# Patient Record
Sex: MALE | Race: WHITE
[De-identification: names, ages, dates, MRNs, and addresses within clinical notes are randomized per-mention and may not be internally consistent; named-entity substitution may affect disease eponyms.]

---

## 2018-03-28 ENCOUNTER — HOSPITAL ENCOUNTER (INPATIENT)
Dept: HOSPITAL 47 - EC | Age: 28
LOS: 4 days | Discharge: HOME | DRG: 872 | End: 2018-04-01
Payer: COMMERCIAL

## 2018-03-28 VITALS — BODY MASS INDEX: 34.9 KG/M2

## 2018-03-28 DIAGNOSIS — J03.00: ICD-10-CM

## 2018-03-28 DIAGNOSIS — F84.0: ICD-10-CM

## 2018-03-28 DIAGNOSIS — J05.10: ICD-10-CM

## 2018-03-28 DIAGNOSIS — G47.33: ICD-10-CM

## 2018-03-28 DIAGNOSIS — E87.0: ICD-10-CM

## 2018-03-28 DIAGNOSIS — D72.829: ICD-10-CM

## 2018-03-28 DIAGNOSIS — T38.0X5A: ICD-10-CM

## 2018-03-28 DIAGNOSIS — E66.01: ICD-10-CM

## 2018-03-28 DIAGNOSIS — A40.0: Primary | ICD-10-CM

## 2018-03-28 DIAGNOSIS — K11.20: ICD-10-CM

## 2018-03-28 LAB
ALBUMIN SERPL-MCNC: 4.4 G/DL (ref 3.5–5)
ALP SERPL-CCNC: 84 U/L (ref 38–126)
ALT SERPL-CCNC: 53 U/L (ref 21–72)
ANION GAP SERPL CALC-SCNC: 18 MMOL/L
AST SERPL-CCNC: 37 U/L (ref 17–59)
BASOPHILS # BLD AUTO: 0.1 K/UL (ref 0–0.2)
BASOPHILS NFR BLD AUTO: 0 %
BUN SERPL-SCNC: 20 MG/DL (ref 9–20)
CALCIUM SPEC-MCNC: 10.1 MG/DL (ref 8.4–10.2)
CHLORIDE SERPL-SCNC: 103 MMOL/L (ref 98–107)
CO2 SERPL-SCNC: 21 MMOL/L (ref 22–30)
EOSINOPHIL # BLD AUTO: 0.1 K/UL (ref 0–0.7)
EOSINOPHIL NFR BLD AUTO: 1 %
ERYTHROCYTE [DISTWIDTH] IN BLOOD BY AUTOMATED COUNT: 5.33 M/UL (ref 4.3–5.9)
ERYTHROCYTE [DISTWIDTH] IN BLOOD: 12.8 % (ref 11.5–15.5)
GLUCOSE BLD-MCNC: 100 MG/DL (ref 75–99)
GLUCOSE SERPL-MCNC: 97 MG/DL (ref 74–99)
HCT VFR BLD AUTO: 47.2 % (ref 39–53)
HGB BLD-MCNC: 15.4 GM/DL (ref 13–17.5)
LYMPHOCYTES # SPEC AUTO: 0.5 K/UL (ref 1–4.8)
LYMPHOCYTES NFR SPEC AUTO: 2 %
MCH RBC QN AUTO: 28.9 PG (ref 25–35)
MCHC RBC AUTO-ENTMCNC: 32.6 G/DL (ref 31–37)
MCV RBC AUTO: 88.5 FL (ref 80–100)
MONOCYTES # BLD AUTO: 1 K/UL (ref 0–1)
MONOCYTES NFR BLD AUTO: 4 %
NEUTROPHILS # BLD AUTO: 22 K/UL (ref 1.3–7.7)
NEUTROPHILS NFR BLD AUTO: 92 %
PLATELET # BLD AUTO: 251 K/UL (ref 150–450)
POTASSIUM SERPL-SCNC: 4.1 MMOL/L (ref 3.5–5.1)
PROT SERPL-MCNC: 7.7 G/DL (ref 6.3–8.2)
SODIUM SERPL-SCNC: 142 MMOL/L (ref 137–145)
WBC # BLD AUTO: 23.8 K/UL (ref 3.8–10.6)

## 2018-03-28 PROCEDURE — 87040 BLOOD CULTURE FOR BACTERIA: CPT

## 2018-03-28 PROCEDURE — 96366 THER/PROPH/DIAG IV INF ADDON: CPT

## 2018-03-28 PROCEDURE — 83605 ASSAY OF LACTIC ACID: CPT

## 2018-03-28 PROCEDURE — 80048 BASIC METABOLIC PNL TOTAL CA: CPT

## 2018-03-28 PROCEDURE — 99284 EMERGENCY DEPT VISIT MOD MDM: CPT

## 2018-03-28 PROCEDURE — 96368 THER/DIAG CONCURRENT INF: CPT

## 2018-03-28 PROCEDURE — 71045 X-RAY EXAM CHEST 1 VIEW: CPT

## 2018-03-28 PROCEDURE — 87430 STREP A AG IA: CPT

## 2018-03-28 PROCEDURE — 96375 TX/PRO/DX INJ NEW DRUG ADDON: CPT

## 2018-03-28 PROCEDURE — 83735 ASSAY OF MAGNESIUM: CPT

## 2018-03-28 PROCEDURE — 85027 COMPLETE CBC AUTOMATED: CPT

## 2018-03-28 PROCEDURE — 70491 CT SOFT TISSUE NECK W/DYE: CPT

## 2018-03-28 PROCEDURE — 85025 COMPLETE CBC W/AUTO DIFF WBC: CPT

## 2018-03-28 PROCEDURE — 96365 THER/PROPH/DIAG IV INF INIT: CPT

## 2018-03-28 PROCEDURE — 36415 COLL VENOUS BLD VENIPUNCTURE: CPT

## 2018-03-28 PROCEDURE — 80053 COMPREHEN METABOLIC PANEL: CPT

## 2018-03-28 RX ADMIN — CEFAZOLIN SCH MLS/HR: 330 INJECTION, POWDER, FOR SOLUTION INTRAMUSCULAR; INTRAVENOUS at 22:24

## 2018-03-28 NOTE — CONS
CONSULTATION



REASON FOR CONSULTATION:

Sore throat.



HISTORY:

This is a 28-year-old white male who has a 2-day history of progressively worsening

sore throat.  He noticed that the right side of his neck was more swollen today and he

has had some difficulty with swallowing. He also has some hoarseness and a little

difficulty with breathing.  He has had no stridor.  He has had difficulty swallowing to

the point where he can handle some of his own secretions but also spits out some of his

secretions.  He did have some emesis earlier also.  He has had subjective fever at

home.  He had a strep-positive culture in the ER.  He has not had difficulties with his

tonsils generally or with tonsillitis.



PAST MEDICAL HISTORY:

Past medical history is negative for heart, lung or kidney disease, diabetes mellitus,

seizures, GI bleed _____.



SURGICAL HISTORY:

None.



ALLERGIES:

NO KNOWN DRUG ALLERGIES.



MEDICATIONS AT HOME:

None.



SOCIAL HISTORY:

He does not smoke or drink alcohol.



REVIEW OF SYSTEMS:

Noncontributory other than as above.



PHYSICAL EXAMINATION:

GENERAL: He is a well-developed young adult white male in no distress, although he does

tend to want to spit up some of his secretions rather than swallow, although he can

handle most of his secretions.  His voice is normal.  He has no stridor.

Vital signs show _____ about 102.1.

HEENT: Head normocephalic and atraumatic.

EARS: Bilateral canals are clear. Tympanic membranes unremarkable, mobile.

Nose shows no drainage or obstruction, although septum is deviated to the right.

Nasopharynx is unremarkable.

Oropharynx shows tonsils +3.  They are erythematous with no exudate.  There is no

trismus.  Good airway at the oropharyngeal level.

Hypopharynx and larynx exam with flexible laryngoscopy shows diffuse erythema of the

epiglottis with thickening approximately 6 mm diffusely.  There is some mild lingual

tonsillar hypertrophy also.

Vocal cords can be well visualized with flexible laryngoscopy and are mildly edematous

and erythematous, but normal mobility is noted.

Neck is supple.  He does have tenderness with diffuse shotty lymphadenopathy

bilaterally, right greater than left, but no fluctuance or erythema is noted.



CT scan report as well as the actual scan reviewed, which shows tonsillar swelling as

well as uvular edema and erythema.  Hypopharyngeal swelling as well as upper glottic

swelling.



ASSESSMENT:

1. Acute tonsillitis.

2. Acute supraglottitis.



PLAN:

Patient will be admitted and he will be placed in the ICU for observation.  He was

given steroids already IV and started on Zosyn, which will continue.  He will be

monitored closely in the ICU.  He does not need any particular airway intervention at

this point, but the airway will be monitored closely as well.



Consultation took 45 minutes with over 50% in patient counseling.



PROCEDURE NOTE:



PREOPERATIVE DIAGNOSIS:

Tonsillitis, epiglottitis.



POSTOPERATIVE DIAGNOSIS:

Tonsillitis, epiglottitis.



PROCEDURE:

Flexible laryngoscopy.



ANESTHESIA:

None.



COMPLICATIONS:

None.



BLOOD LOSS:

None.



FINDINGS:

See consult note.



PROCEDURE DESCRIPTION:

The patient was in his hospital bed in a sitting position.  Flexible laryngoscopy was

performed with systematic evaluation of the left nasal cavity, nasopharynx, oropharynx,

hypopharynx and larynx with the above findings noted.  The patient tolerated the

procedure well with no complications.  The laryngoscope was withdrawn.





MMODL / IJN: [id number] / Job#: [id number]

## 2018-03-28 NOTE — CT
EXAMINATION TYPE: CT soft tissue neck w con

 

DATE OF EXAM: 3/28/2018 8:55 PM

 

COMPARISON: NONE

 

HISTORY: Right side throat pain and SAÚL

 

CT DLP: 867.1 mGycm

Automated exposure control for dose reduction was used.

 

CONTRAST: 

CT scan of the neck is performed following with IV Contrast, patient injected with 100ml mL of Isovue
 300.  Axial images are obtained, coronal and sagittal reformatted images are reviewed.

 

FINDINGS:

 

There is normal branching pattern of the great vessels on the aortic arch. Thyroid gland is symmetric
. Trachea appears normal. There is mild thickening of the epiglottis. There is narrowing of the hypop
haryngeal airway with apparent edema seen bilaterally in the hypopharynx.

 

There is mild hypertrophy of the tonsils that measure 3.5 x 1.5 cm.

 

There are mucous retention cysts in the maxillary sinuses. There is asymmetric enlargement of the rig
ht submandibular salivary gland with surrounding fat stranding. I see no discrete abscess. The paroti
d glands are symmetric. The cervical vertebra show normal spacing and alignment.

IMPRESSION:  Enlarged right submandibular salivary gland with surrounding fat stranding consistent wi
th acute inflammatory process and cellulitis.

 

Bilateral tonsil enlargement.

 

Narrowing of the hypopharyngeal airway with edema. Hypopharyngeal airway is significantly narrowed. T
here is pharyngeal edema. There is thickening of the epiglottis suggestive of epiglottitis. This exam
 was discussed with ER physician at 9:10 PM.

 

Small mucous retention cysts in the maxillary sinuses.

## 2018-03-28 NOTE — ED
ENT HPI





<Jose Juan Christopher - Last Filed: 03/28/18 21:38>





- General


Source: patient


Mode of arrival: wheelchair


Limitations: no limitations





<Karin Mujica - Last Filed: 03/28/18 22:35>





- General


Chief complaint: ENT


Stated complaint: Sore thorat/swollen


Time Seen by Provider: 03/28/18 18:55





- History of Present Illness


Initial comments: 


28-year-old male patient presents to the emergency department today for 

complaints of sore throat and right-sided neck swelling.  Patient states Friday 

patient started to develop sore throat what he thought was a cold.  Patient 

states that his pain has been worsening over the last few days.  States that 

today when he woke up the right side of his neck was swollen, states he was 

having difficulty swallowing, and states that it feels difficult to breathe due 

to the swelling.  Patient states he has more pain on the right side on the left 

in the throat.  Patient states that today he is unable to keep down any fluids.

  States he has vomited several times.  He denies any nasal congestion or 

cough.  Denies any abdominal pain, constipation, or diarrhea. Patient denies 

any recent rash, shortness breath, chest pain, back pain, numbness, tingling, 

dizziness, weakness, hematuria, dysuria, urinary urgency, urinary frequency, 

headache, visual changes, or any other complaints.


 (Karin Mujica)





- Related Data


 Home Medications











 Medication  Instructions  Recorded  Confirmed


 


No Known Home Medications [No  03/28/18 03/28/18





Known Home Medications]   











 Allergies











Allergy/AdvReac Type Severity Reaction Status Date / Time


 


No Known Allergies Allergy   Verified 03/28/18 19:15














Review of Systems


ROS Other: All systems not noted in ROS Statement are negative.





<Jose Juan Christopher - Last Filed: 03/28/18 21:38>


ROS Other: All systems not noted in ROS Statement are negative.





<Karin Mujica - Last Filed: 03/28/18 22:35>


ROS Statement: 


Those systems with pertinent positive or pertinent negative responses have been 

documented in the HPI.








Past Medical History


Past Medical History: No Reported History


History of Any Multi-Drug Resistant Organisms: None Reported


Past Surgical History: No Surgical Hx Reported


Past Psychological History: No Psychological Hx Reported


Smoking Status: Never smoker


Past Alcohol Use History: None Reported


Past Drug Use History: None Reported





<Karin Mujica - Last Filed: 03/28/18 22:35>





General Exam


Limitations: no limitations


General appearance: alert, in no apparent distress, other (This is a well-

developed, obese male patient in no acute distress.  Vital signs upon 

presentation are temperature 102.1F, pulse 145, respirations 20, blood 

pressure 132/82, pulse ox 96% on room air.)


Eye exam: Present: normal appearance, PERRL, EOMI.  Absent: scleral icterus, 

conjunctival injection, periorbital swelling


ENT exam: Present: normal exam, mucous membranes moist, TM's normal 

bilaterally.  Absent: normal oropharynx (Unable to visualize pharynx due to 

patient's inability to open his mouth. )


Neck exam: Present: tenderness (Right sided neck tenderness), lymphadenopathy (

Right anterior cervical. ).  Absent: normal inspection, meningismus


Respiratory exam: Present: normal lung sounds bilaterally.  Absent: respiratory 

distress, wheezes, rales, rhonchi, stridor


Cardiovascular Exam: Present: normal rhythm, tachycardia, normal heart sounds.  

Absent: systolic murmur, diastolic murmur, rubs, gallop, clicks


GI/Abdominal exam: Present: soft, normal bowel sounds.  Absent: distended, 

tenderness, guarding, rebound, rigid


Neurological exam: Present: alert, oriented X3, CN II-XII intact


Psychiatric exam: Present: normal affect, normal mood


Skin exam: Present: warm, dry, intact, normal color.  Absent: rash





<Karin Mujica - Last Filed: 03/28/18 22:35>





Course





<Jose Juan Christopher - Last Filed: 03/28/18 21:38>





<Karin Mujica - Last Filed: 03/28/18 22:35>


 Vital Signs











  03/28/18 03/28/18 03/28/18





  18:47 19:16 20:32


 


Temperature 102.1 F H  101.8 F H


 


Pulse Rate 145 H 144 H 117 H


 


Respiratory 20 18 22





Rate   


 


Blood Pressure 132/82 127/64 137/61


 


O2 Sat by Pulse 96 95 96





Oximetry   














  03/28/18





  22:26


 


Temperature 99.3 F


 


Pulse Rate 100


 


Respiratory 20





Rate 


 


Blood Pressure 124/63


 


O2 Sat by Pulse 95





Oximetry 














- Reevaluation(s)


Reevaluation #1: 





03/28/18 21:28


Patient reevaluated by myself, Dr. Christopher.  Patient does state he feels somewhat 

short of breath.  Patient is able to speak in 2-4 word sentences.  Patient 

states speaking is not that difficult.  Patient does think his voice is 

somewhat hoarse.  Patient does have some mild to moderate swelling of the right 

lateral submandibular/mandibular region.  Pharynx with mild swelling of the 

tonsils and uvula with erythema.  Patient is not in respiratory distress at 

this time.  CT report and image reviewed.  Patient updated.  Dr. Salcedo has 

been paged.  Patient does meet sepsis criteria.


03/28/18 21:33


Case was discussed with Dr. Juarez, who will come to evaluate the patient.  

He does feel patient should be admitted to medicine and he will consult.  He 

agrees patient should be watched in the ICU tonight.


03/28/18 21:36


Case was also discussed with Dr. Dominique, who will admit for hospital call.  He 

requests consult for Dr. Mendez for ICU.


03/28/18 21:38


Case also discussed with Dr. Andersen, who will consult for ICU care. (Jose Juan Christopher)





Medical Decision Making





- Lab Data


Result diagrams: 


 03/28/18 19:21





 03/28/18 19:21





<Jose Juan Christopher - Last Filed: 03/28/18 21:38>





- Lab Data


Result diagrams: 


 03/28/18 19:21





 03/28/18 19:21





- Radiology Data


Radiology results: report reviewed, image reviewed





<Karin Mujica - Last Filed: 03/28/18 22:35>





- Medical Decision Making


28-year-old male patient presents into the emergency department today for 

evaluation of right-sided neck swelling, sore throat, and vomiting.  Physical 

examination does reveal right anterior cervical swelling, tonsillar hypertrophy 

and erythema.  Labs were performed and did show an elevated white blood cell 

count at 23.8, neutrophil count at 22.  Patient did test positive for strep. CT 

of the soft tissue of the neck did show salivary gland inflammation and fat 

stranding, epiglottic swelling, and subglottic swelling.  Patient did receive 

IV Decadron, IV fluids, Toradol, and over medicated here in the department.  

Patient is reporting very mild improvement of his symptoms.  He still continues 

to report difficulty breathing and swallowing.  He is not tolerating secretions 

coming spitting them out.  He does have some mild resting stridor.  Did discuss 

the case with my attending Dr. Mireles did see and evaluate the patient.  Case 

has been discussed with Dr. Andersen and Dr. Hamilton who agree to ICU admission 

for airway monitoring and management. Dr. Golden from ENT was in to see the 

patient, did perform a scope which confirmed CT findings of Epitglottic 

swelling and tonsillar swelling. We will continue patient on Zosyn and solu-

medrol. 


 (Karin Mujica)





- Lab Data


 Lab Results











  03/28/18 03/28/18 03/28/18 Range/Units





  19:21 19:21 19:21 


 


WBC  23.8 H    (3.8-10.6)  k/uL


 


RBC  5.33    (4.30-5.90)  m/uL


 


Hgb  15.4    (13.0-17.5)  gm/dL


 


Hct  47.2    (39.0-53.0)  %


 


MCV  88.5    (80.0-100.0)  fL


 


MCH  28.9    (25.0-35.0)  pg


 


MCHC  32.6    (31.0-37.0)  g/dL


 


RDW  12.8    (11.5-15.5)  %


 


Plt Count  251    (150-450)  k/uL


 


Neutrophils %  92    %


 


Lymphocytes %  2    %


 


Monocytes %  4    %


 


Eosinophils %  1    %


 


Basophils %  0    %


 


Neutrophils #  22.0 H    (1.3-7.7)  k/uL


 


Lymphocytes #  0.5 L    (1.0-4.8)  k/uL


 


Monocytes #  1.0    (0-1.0)  k/uL


 


Eosinophils #  0.1    (0-0.7)  k/uL


 


Basophils #  0.1    (0-0.2)  k/uL


 


Sodium   142   (137-145)  mmol/L


 


Potassium   4.1   (3.5-5.1)  mmol/L


 


Chloride   103   ()  mmol/L


 


Carbon Dioxide   21 L   (22-30)  mmol/L


 


Anion Gap   18   mmol/L


 


BUN   20   (9-20)  mg/dL


 


Creatinine   1.00   (0.66-1.25)  mg/dL


 


Est GFR (CKD-EPI)AfAm   >90   (>60 ml/min/1.73 sqM)  


 


Est GFR (CKD-EPI)NonAf   >90   (>60 ml/min/1.73 sqM)  


 


Glucose   97   (74-99)  mg/dL


 


Plasma Lactic Acid Duc     (0.7-2.0)  mmol/L


 


Calcium   10.1   (8.4-10.2)  mg/dL


 


Total Bilirubin   0.7   (0.2-1.3)  mg/dL


 


AST   37   (17-59)  U/L


 


ALT   53   (21-72)  U/L


 


Alkaline Phosphatase   84   ()  U/L


 


Total Protein   7.7   (6.3-8.2)  g/dL


 


Albumin   4.4   (3.5-5.0)  g/dL


 


Group A Strep Rapid    Positive A  (Negative)  














  03/28/18 Range/Units





  19:21 


 


WBC   (3.8-10.6)  k/uL


 


RBC   (4.30-5.90)  m/uL


 


Hgb   (13.0-17.5)  gm/dL


 


Hct   (39.0-53.0)  %


 


MCV   (80.0-100.0)  fL


 


MCH   (25.0-35.0)  pg


 


MCHC   (31.0-37.0)  g/dL


 


RDW   (11.5-15.5)  %


 


Plt Count   (150-450)  k/uL


 


Neutrophils %   %


 


Lymphocytes %   %


 


Monocytes %   %


 


Eosinophils %   %


 


Basophils %   %


 


Neutrophils #   (1.3-7.7)  k/uL


 


Lymphocytes #   (1.0-4.8)  k/uL


 


Monocytes #   (0-1.0)  k/uL


 


Eosinophils #   (0-0.7)  k/uL


 


Basophils #   (0-0.2)  k/uL


 


Sodium   (137-145)  mmol/L


 


Potassium   (3.5-5.1)  mmol/L


 


Chloride   ()  mmol/L


 


Carbon Dioxide   (22-30)  mmol/L


 


Anion Gap   mmol/L


 


BUN   (9-20)  mg/dL


 


Creatinine   (0.66-1.25)  mg/dL


 


Est GFR (CKD-EPI)AfAm   (>60 ml/min/1.73 sqM)  


 


Est GFR (CKD-EPI)NonAf   (>60 ml/min/1.73 sqM)  


 


Glucose   (74-99)  mg/dL


 


Plasma Lactic Acid Duc  1.6  (0.7-2.0)  mmol/L


 


Calcium   (8.4-10.2)  mg/dL


 


Total Bilirubin   (0.2-1.3)  mg/dL


 


AST   (17-59)  U/L


 


ALT   (21-72)  U/L


 


Alkaline Phosphatase   ()  U/L


 


Total Protein   (6.3-8.2)  g/dL


 


Albumin   (3.5-5.0)  g/dL


 


Group A Strep Rapid   (Negative)  














- Radiology Data





CT of the soft tissue of the neck with contrast was obtained, report was 

reviewed in its entirety.  Impression by Dr. Mckeon shows enlargement of the 

right submandibular salivary gland with surrounding fat stranding consistent 

with acute inflammatory process and cellulitis.  Bilateral tonsil enlargement.  

Hearing of the hypopharyngeal airway with edema.  Hypopharyngeal airway is 

significantly narrowed.  There is pharyngeal edema.  There is thickening of the 

epiglottis suggestive of epiglottitis. (Karin Mujica)





Disposition





<Jose Juan Christopher - Last Filed: 03/28/18 21:38>


Decision to Admit Reason: Admit from EC


Decision Date: 03/28/18


Decision Time: 21:46





<Karin Mujica - Last Filed: 03/28/18 22:35>


Clinical Impression: 


 Salivary gland infection, Strep pharyngitis, Epiglottitis, Sepsis





Disposition: ADMITTED AS IP TO THIS [facility]


Condition: Serious

## 2018-03-28 NOTE — P.HPIM
History of Present Illness


H&P Date: 03/28/18


Chief Complaint: difficulty swallowing, sore throat





27-year-old male with no significant past medical history.  Presented with 

symptoms of upper respiratory airway infection of 2 days' duration.  Patient 

reports symptoms of sore throat initially however this has been progressing 

over the past 2 days and to initially some odontophagia and today trouble 

managing his own secretions and inability to swallow even liquids due to pain.  

He also noticed swelling of the right side of his neck along with some 

hoarseness of his voice.  This reached a point where he starts noticing some 

trouble breathing 2 for which she decided come to the hospital he denies any 

stridors but in the ER he was unable to clear his own secretions and he had to 

spit told time.  He threw up once nonbloody nonbilious.  Denies any chest pain 

denies any abdominal pain he reports some subjective fever at home.  He reports 

that he is generally healthy and does not have any medical problems.


In the emergency department he tested positive for strep throat.  ENT was 

consultative due to trouble managing his secretions and to evaluate his airways

, he was deemed to be stable but to be at least monitored in the ICU in case of 

airway compromise and requiring intubation.


Computed tomography scan of soft tissues of the neck was done in the ED showed 

tonsillar enlargement along with uvular edema and hypopharyngeal swelling.





Advanced directives discussed with the patient elected full code and named his 

wife is surrogate decision maker





Review of Systems





Constitutional: Patient reports subjective fever, denies chills, denies night 

sweating,  denies significant weight changes


Eyes: Patient   denies visual changes, denies eye pain


ENT: Patient   denies ear pain, denies rhinorrhea, reports sore throat


Cardiovascular:  Patient   denies chest pain, denies exertional dyspnea, denies 

peripheral leg edema, denies orthopnea, denies paroxysmal nocturnal dyspnea


Respiratory:Patient   denies cough, denies wheezing, denies shortness of breath

, reports difficulty breathing due to right neck swelling


Gastrointestinal: Patient   denies diarrhea, denies constipation, denies nausea

, denies vomiting, denies abdominal pain


Genitourinary: Patient   denies dysuria, denies hematuria, denies changes in 

urinary habits, denies genital  lesions


Musculoskeletal: Patient   denies muscle pain, denies joint pain


Psychiatric:  Patient   denies changes in mood or memory, denies suicidal 

ideation, denies anxiety


Endocrine:  Patient   denies heat intolerance, denies cold intolerance, denies 

excessive thirst, denies polyuria


Neurological: Patient   denies focal neurologic deficits, denies weakness, 

denies numbness, denies tingling


Hem/Lymphatic: Patient   denies bleeding tendency, denies bruising, denies 

swollen lymph glands


Allergic/Immun: Patient   denies recent allergic reactions


Skin: Patient   denies rashes, denies pruritis, denies ulcers














Past Medical History


Past Medical History: No Reported History


History of Any Multi-Drug Resistant Organisms: None Reported


Past Surgical History: No Surgical Hx Reported


Past Psychological History: No Psychological Hx Reported


Smoking Status: Never smoker


Past Alcohol Use History: None Reported


Past Drug Use History: None Reported





- Past Family History


  ** Father


History Unknown: Yes


Additional Family Medical History / Comment(s): Factor V blood disorder





Medications and Allergies


Home Medications and Allergies Comment(s): 





Patient does not take any prescription medications


 Home Medications











 Medication  Instructions  Recorded  Confirmed  Type


 


No Known Home Medications [No  03/28/18 03/28/18 History





Known Home Medications]    











 Allergies











Allergy/AdvReac Type Severity Reaction Status Date / Time


 


No Known Allergies Allergy   Verified 03/28/18 19:15














Physical Exam


Vitals: 


 Vital Signs











  Temp Pulse Resp BP Pulse Ox


 


 03/28/18 22:26  99.3 F  100  20  124/63  95


 


 03/28/18 20:32  101.8 F H  117 H  22  137/61  96


 


 03/28/18 19:16   144 H  18  127/64  95


 


 03/28/18 18:47  102.1 F H  145 H  20  132/82  96








 Intake and Output











 03/28/18 03/28/18 03/29/18





 14:59 22:59 06:59


 


Other:   


 


  Weight  127.006 kg 














Constitutional:          No acute distress, conversant, pleasant, patient is 

spitting up with his oral secretions is unable to swallow.


Eyes:      Anicteric sclerae, moist conjunctiva, no lid-lag


         Pupils equal round reactive to light





ENMT:      NC/AT


         Limited exam due to inability to fully open his mouth, enlarged 

tonsils with erythema





Neck:      Swelling of the right side of the neck tender to palpation with 

multiple palpable submental and cervical lymph nodes


         Supple, or JVD, no stridors


         No carotid bruits


         No thyromegaly





Lungs:      Clear to auscultation


         Clear to percussion


         Normal respiratory effort, no accessory muscle use 





Cardiovascular:      Heart regular in rate and rhythm, 


         No murmurs, gallops, or rubs


         No peripheral edema





Abdominal:       Soft


         Nontender, no guarding, rebound or rigidity


         Abdomen moving with respiration


         Normoactive bowel sounds


         No hepatomegaly, No splenomegaly


         No palpable mass 


         No abdominal wall hernia noted 





Skin:      Normal temperature, tone, texture, turgor


         No induration


         No subcutaneous nodules 


         No rash, lesions


         No ulcers





Extremities:      No digital cyanosis 


         No clubbing


         Pedal pulses intact and symmetrical


         Radial pulses intact and symmetrical 


         No calf tenderness 





Psychiatric:      Alert and oriented to person, place and time


         Appropriate affect


         fair judgment   


      


Neuro      Muscles Strength 5/5 in all 4 extremities 


         Sensation to light touch grossly present throughout


         Cranial nerves II-XII grossly intact


         No focal sensory deficits


Lymphatics:       palpable cervical and submental lymph nodes, 





Results


CBC & Chem 7: 


 03/28/18 19:21





 03/28/18 19:21


Labs: 


 Abnormal Lab Results - Last 24 Hours (Table)











  03/28/18 03/28/18 03/28/18 Range/Units





  19:21 19:21 19:21 


 


WBC  23.8 H    (3.8-10.6)  k/uL


 


Neutrophils #  22.0 H    (1.3-7.7)  k/uL


 


Lymphocytes #  0.5 L    (1.0-4.8)  k/uL


 


Carbon Dioxide   21 L   (22-30)  mmol/L


 


POC Glucose (mg/dL)     (75-99)  mg/dL


 


Group A Strep Rapid    Positive A  (Negative)  














  03/28/18 Range/Units





  22:51 


 


WBC   (3.8-10.6)  k/uL


 


Neutrophils #   (1.3-7.7)  k/uL


 


Lymphocytes #   (1.0-4.8)  k/uL


 


Carbon Dioxide   (22-30)  mmol/L


 


POC Glucose (mg/dL)  100 H  (75-99)  mg/dL


 


Group A Strep Rapid   (Negative)  














Assessment and Plan


Assessment: 





28-year-old male with no significant past medical history presented due to 

progressive sore throat along with swelling of the right side of the neck 

resulting in changes in his voice, some difficulty in breathing, and initially 

odynophagia and then later trouble managing his own oral secretions.  Further 

investigation in the ED showed positive strep throat, along with evidence of 

pharyngeal swelling that's compromising airways and swallowing.  Patient was 

admitted to the ICU for further care and closer monitoring of the airways in 

case patient needed intubation


Plan: 





#Sepsis secondary to strep throat resulting in tonsillitis and epiglottitis


#Acute strep tonsillitis


#Acute supraglottitis with epiglottitis


#Pharyngeal edema with with odynophagia and some degree of difficulty breathing


ENT consultation noted


Continue with IV Zosyn


Continue with IV Solu-Medrol


Pain control with NSAIDs


Close monitoring of airways


Suctioning of oral secretions


ICU care





#DVT prophylaxis heparin subcu 3 times a day





Surrogate decision-maker: Patient wife


CODE STATUS: Full code


DVT prophylaxis: *Heparin subcu 3 times a day


Discussed with: Patient, ER, family, RN


Anticipated discharge: 48-72 hours


Anticipated discharge place: Home


A total of 50 minutes were spent on the care of this complex patient more than 

50% of the time was spent in counseling and care coordination.

## 2018-03-29 LAB
ANION GAP SERPL CALC-SCNC: 16 MMOL/L
BASOPHILS # BLD AUTO: 0 K/UL (ref 0–0.2)
BASOPHILS NFR BLD AUTO: 0 %
BUN SERPL-SCNC: 21 MG/DL (ref 9–20)
CALCIUM SPEC-MCNC: 9.4 MG/DL (ref 8.4–10.2)
CHLORIDE SERPL-SCNC: 107 MMOL/L (ref 98–107)
CO2 SERPL-SCNC: 21 MMOL/L (ref 22–30)
EOSINOPHIL # BLD AUTO: 0 K/UL (ref 0–0.7)
EOSINOPHIL NFR BLD AUTO: 0 %
ERYTHROCYTE [DISTWIDTH] IN BLOOD BY AUTOMATED COUNT: 4.96 M/UL (ref 4.3–5.9)
ERYTHROCYTE [DISTWIDTH] IN BLOOD: 12.9 % (ref 11.5–15.5)
GLUCOSE SERPL-MCNC: 130 MG/DL (ref 74–99)
HCT VFR BLD AUTO: 44.7 % (ref 39–53)
HGB BLD-MCNC: 14.6 GM/DL (ref 13–17.5)
LYMPHOCYTES # SPEC AUTO: 0.7 K/UL (ref 1–4.8)
LYMPHOCYTES NFR SPEC AUTO: 3 %
MCH RBC QN AUTO: 29.5 PG (ref 25–35)
MCHC RBC AUTO-ENTMCNC: 32.7 G/DL (ref 31–37)
MCV RBC AUTO: 90.2 FL (ref 80–100)
MONOCYTES # BLD AUTO: 0.7 K/UL (ref 0–1)
MONOCYTES NFR BLD AUTO: 3 %
NEUTROPHILS # BLD AUTO: 23.5 K/UL (ref 1.3–7.7)
NEUTROPHILS NFR BLD AUTO: 94 %
PLATELET # BLD AUTO: 236 K/UL (ref 150–450)
POTASSIUM SERPL-SCNC: 4.5 MMOL/L (ref 3.5–5.1)
SODIUM SERPL-SCNC: 144 MMOL/L (ref 137–145)
WBC # BLD AUTO: 25.1 K/UL (ref 3.8–10.6)

## 2018-03-29 PROCEDURE — 0CJS8ZZ INSPECTION OF LARYNX, VIA NATURAL OR ARTIFICIAL OPENING ENDOSCOPIC: ICD-10-PCS

## 2018-03-29 RX ADMIN — DEXTROSE MONOHYDRATE SCH MLS/HR: 5 INJECTION, SOLUTION INTRAVENOUS at 23:45

## 2018-03-29 RX ADMIN — METHYLPREDNISOLONE SODIUM SUCCINATE SCH MG: 125 INJECTION, POWDER, FOR SOLUTION INTRAMUSCULAR; INTRAVENOUS at 23:43

## 2018-03-29 RX ADMIN — DEXTROSE MONOHYDRATE SCH MLS/HR: 5 INJECTION, SOLUTION INTRAVENOUS at 15:34

## 2018-03-29 RX ADMIN — METHYLPREDNISOLONE SODIUM SUCCINATE SCH MG: 125 INJECTION, POWDER, FOR SOLUTION INTRAMUSCULAR; INTRAVENOUS at 11:55

## 2018-03-29 RX ADMIN — METHYLPREDNISOLONE SODIUM SUCCINATE SCH MG: 125 INJECTION, POWDER, FOR SOLUTION INTRAMUSCULAR; INTRAVENOUS at 00:09

## 2018-03-29 RX ADMIN — CEFAZOLIN SCH MLS/HR: 330 INJECTION, POWDER, FOR SOLUTION INTRAMUSCULAR; INTRAVENOUS at 08:22

## 2018-03-29 RX ADMIN — DEXTROSE MONOHYDRATE SCH MLS/HR: 5 INJECTION, SOLUTION INTRAVENOUS at 08:22

## 2018-03-29 RX ADMIN — HEPARIN SODIUM SCH UNIT: 5000 INJECTION, SOLUTION INTRAVENOUS; SUBCUTANEOUS at 08:22

## 2018-03-29 RX ADMIN — METHYLPREDNISOLONE SODIUM SUCCINATE SCH MG: 125 INJECTION, POWDER, FOR SOLUTION INTRAMUSCULAR; INTRAVENOUS at 06:08

## 2018-03-29 RX ADMIN — HEPARIN SODIUM SCH UNIT: 5000 INJECTION, SOLUTION INTRAVENOUS; SUBCUTANEOUS at 15:34

## 2018-03-29 RX ADMIN — CEFAZOLIN SCH MLS/HR: 330 INJECTION, POWDER, FOR SOLUTION INTRAMUSCULAR; INTRAVENOUS at 18:00

## 2018-03-29 RX ADMIN — ACETAMINOPHEN PRN MLS/HR: 10 INJECTION, SOLUTION INTRAVENOUS at 19:47

## 2018-03-29 RX ADMIN — METHYLPREDNISOLONE SODIUM SUCCINATE SCH MG: 125 INJECTION, POWDER, FOR SOLUTION INTRAMUSCULAR; INTRAVENOUS at 18:00

## 2018-03-29 RX ADMIN — ACETAMINOPHEN PRN MLS/HR: 10 INJECTION, SOLUTION INTRAVENOUS at 08:30

## 2018-03-29 RX ADMIN — DEXTROSE MONOHYDRATE SCH: 5 INJECTION, SOLUTION INTRAVENOUS at 00:08

## 2018-03-29 NOTE — PCN
PROCEDURE NOTE



PREOPERATIVE DIAGNOSIS:

Supraglottitis.



POSTOP DIAGNOSIS:

Supraglottitis.



PROCEDURE:

Flexible laryngoscopy.



ANESTHESIA:

None.



COMPLICATIONS:

None.



BLOOD LOSS:

None.



DESCRIPTION OF PROCEDURE:

The patient was in his hospital chair in a sitting position.  Flexible laryngoscopy was

performed through the left nasal cavity with systematic evaluation of the left nasal

cavity, nasopharynx, oropharynx, hypopharynx and larynx with the above-noted findings

in the consult note noted.  Laryngoscope was withdrawn.  Patient tolerated this well

with no complications.





MMODL / IJN: [id number] / Job#: [id number]

## 2018-03-29 NOTE — XR
EXAMINATION TYPE: XR chest 1V

 

DATE OF EXAM: 3/29/2018

 

COMPARISON: NONE

 

HISTORY: Epiglottitis and difficulty breathing

 

TECHNIQUE: Single frontal view of the chest is obtained.

 

FINDINGS:  There is no evident airspace disease, pneumothorax, or pleural effusion. Heart size is at 
the upper limit of normal. There are overlying cardiac leads. Pulmonary vascularity and mark within n
ormal limits. 

 

IMPRESSION:  No acute process. Borderline heart size. Technique may be accentuating the appearance. F
ollow-up as indicated.

## 2018-03-29 NOTE — P.CNPUL
History of Present Illness


Consult date: 03/29/18


Chief complaint: Acute shortness of breath/epiglottitis


History of present illness: 





28-year-old obese male patient who presented to the hospital yesterday because 

of today history of sore throat and subsequently noted swelling in the right 

neck and progressive shortness of breath and difficulty in swallowing.  He also 

started losing his voice and he became hoarse and he was drooling.  He did have 

some emesis earlier.  He had subjectively feeling febrile .  In the burst 

department the patient was found to have leukocytosis with a white cell count 

of 25.  The rapid strep screen was positive.  He was started on Zosyn and he 

was given a dose of Decadron and subsequently was started on Solu-Medrol.  The 

CAT scan of the neck showed narrowing of the hypopharyngeal airway secondary to 

edema.  The hypopharyngeal airway was significantly narrowed due to the edema.  

There was thickening of the epiglottis suggestive of acute epiglottitis.  In 

the emergency the patient was seen by ENT and the flexible laryngoscopy that 

was done showed edematous and erythematous vocal cords, hypopharynx and the 

larynx were diffusely erythematous and there was evidence of epiglottitis which 

was taken approximately 6 mm in size and thickness and was diffuse 

inflammation.  There was some lingular tonsillar hypertrophy in addition.  The 

patient also had some erythema in the posterior oropharynx with no exudate.  No 

trismus.  Currently is in the intensive care unit.  Feeling better compared to 

yesterday.  Is able to speak longer sentences.  He has no stridor.  He remains 

on the same treatment.  Family is at the bedside.  No altered mentation.  No 

respiratory distress.





Review of Systems


Constitutional: Reports fatigue, Reports fever


Eyes: denies blurred vision, denies bulging eye, denies decreased vision


Ears: deny: decreased hearing, ear discharge, earache


Ears, nose, mouth and throat: Reports dysphagia, Reports neck fullness/pressure

, Reports swelling in mouth, Reports swelling in throat, Reports sore throat


Cardiovascular: Reports dyspnea on exertion


Respiratory: Reports dyspnea, Reports snoring


Gastrointestinal: Denies abdominal pain, Denies diarrhea, Denies nausea, Denies 

vomiting


Genitourinary: Reports as per HPI


Musculoskeletal: Denies myalgias


Musculoskeletal: absent: ankle pain, ankle stiffness, ankle swelling


Integumentary: Denies pruritus, Denies rash


Neurological: Denies numbness, Denies weakness


Psychiatric: Denies anxiety, Denies depression


Endocrine: Denies fatigue, Denies weight change


Hematologic/Lymphatic: Reports as per HPI


Allergic/Immunologic: Reports as per HPI





Past Medical History


Past Medical History: No Reported History


Additional Past Medical History / Comment(s): lungs collapsed during birth, 

obesity


History of Any Multi-Drug Resistant Organisms: None Reported


Past Surgical History: No Surgical Hx Reported


Past Psychological History: No Psychological Hx Reported


Smoking Status: Never smoker


Past Alcohol Use History: None Reported


Past Drug Use History: None Reported





- Past Family History


  ** Father


History Unknown: Yes


Additional Family Medical History / Comment(s): Factor V blood disorder





Medications and Allergies


 Home Medications











 Medication  Instructions  Recorded  Confirmed  Type


 


No Known Home Medications [No  03/28/18 03/28/18 History





Known Home Medications]    











 Allergies











Allergy/AdvReac Type Severity Reaction Status Date / Time


 


No Known Allergies Allergy   Verified 03/28/18 19:15














Physical Exam


Vitals: 


 Vital Signs











  Temp Pulse Pulse Resp BP BP Pulse Ox


 


 03/29/18 09:00   117 H   32 H  116/76   96


 


 03/29/18 08:30   117 H   22  116/76   96


 


 03/29/18 08:00  100.7 F H  120 H  117 H  17  123/76   94 L


 


 03/29/18 07:30   113 H   16  123/76   95


 


 03/29/18 07:00   113 H   28 H  139/77   95


 


 03/29/18 06:30   108 H   11 L  139/77   97


 


 03/29/18 06:00   108 H   17  128/75   95


 


 03/29/18 05:30   113 H   15  128/75   94 L


 


 03/29/18 05:00  98 F  100   16  126/79   97


 


 03/29/18 04:30   106 H   14  126/79   97


 


 03/29/18 04:00   93  98  13  142/78   96


 


 03/29/18 03:30   101 H   22  142/78   94 L


 


 03/29/18 03:00   100   15  130/77   95


 


 03/29/18 02:30   91   13  130/77   96


 


 03/29/18 02:00   100   13  139/74   93 L


 


 03/29/18 01:30   108 H   13  139/74   95


 


 03/29/18 01:00   98   27 H  139/74   95


 


 03/29/18 00:30   101 H   21  130/79   94 L


 


 03/29/18 00:00   106 H  98  27 H  130/79   97


 


 03/28/18 23:30  98.6 F  118 H   14  133/73   96


 


 03/28/18 23:00   107 H    133/73   96


 


 03/28/18 22:50   106 H     


 


 03/28/18 22:26  99.3 F  100   20  124/63   95


 


 03/28/18 22:05  98.4 F    20   133/73 


 


 03/28/18 20:32  101.8 F H  117 H   22  137/61   96


 


 03/28/18 19:16   144 H   18  127/64   95


 


 03/28/18 18:47  102.1 F H  145 H   20  132/82   96








 Intake and Output











 03/28/18 03/29/18 03/29/18





 22:59 06:59 14:59


 


Intake Total  900 250


 


Output Total  340 400


 


Balance  560 -150


 


Intake:   


 


  IV  900 200


 


    Sodium Chloride 0.9% 1,  900 200





    000 ml @ 100 mls/hr IV .   





    Q10H [facility] Rx#:[id number]   


 


  Intake, IV Titration   50





  Amount   


 


    Piperacillin-Tazobactam 3   50





    .375 gm In Dextrose/Water   





    1 50ml.bag @ 12.5 mls/hr   





    IVPB ONCE STA Rx#:   





    [id number]   


 


Output:   


 


  Urine  340 400


 


Other:   


 


  Voiding Method  Urinal 


 


  Weight 127.006 kg 134.3 kg 134.3 kg














Gen. appearance the patient is calm comfortable in no acute distress.  He is 

obese well-developed young male.  No audible striders.Head exam was generally 

normal. There was no scleral icterus or corneal arcus. Mucous membranes were 

moist.  Neck is short and supple.  There is crowding of the posterior 

oropharynx with a Mallampati class IV.  There is some mild tonsillar 

enlargement.  There is some mild erythema of the posterior oropharynx.  The 

patient has no exudate.  The neck is slightly tender upon palpation.  No masses 

could be felt.  No lymphadenopathy.  Please refer to the full laryngoscopy that 

was done by ENT.Lungs were clear to auscultation and percussion, and with 

normal diaphragmatic excursion. No wheezes or rales were noted. Cardiac exam 

revealed the PMI to be normally situated and sized. The rhythm was regular and 

no extrasystoles were noted during several minutes of auscultation. The first 

and second heart sounds were normal and physiologic splitting of the second 

heart sound was noted. There were no murmurs, rubs, clicks, or 

gallops.Abdominal exam revealed normal bowel sounds. The abdomen was soft, non-

tender, and without masses, organomegaly, or appreciable enlargement of the 

abdominal aorta.Examination of the extremities revealed easily palpable radial, 

femoral and pedal pulses. There was no cyanosis, clubbing or edema.Examination 

of the skin revealed no evidence of significant rashes, suspicious appearing 

nevi or other concerning lesions.  Neurologically the patient is awake and 

alert and there is no focal neurological deficit.





Results





- Laboratory Findings


CBC and BMP: 


 03/29/18 05:10





 03/29/18 05:10


Abnormal lab findings: 


 Abnormal Labs











  03/28/18 03/28/18 03/28/18





  19:21 19:21 19:21


 


WBC  23.8 H  


 


Neutrophils #  22.0 H  


 


Lymphocytes #  0.5 L  


 


Carbon Dioxide   21 L 


 


BUN   


 


Glucose   


 


POC Glucose (mg/dL)   


 


Group A Strep Rapid    Positive A














  03/28/18 03/29/18 03/29/18





  22:51 05:10 05:10


 


WBC   25.1 H* 


 


Neutrophils #   23.5 H 


 


Lymphocytes #   0.7 L 


 


Carbon Dioxide    21 L


 


BUN    21 H


 


Glucose    130 H


 


POC Glucose (mg/dL)  100 H  


 


Group A Strep Rapid   














Assessment and Plan


Plan: 





Assessment





1 acute tonsillitis/supraglottitis, epiglottitis with secondary respiratory 

distress, this is secondary to a acute streptococcal infection.





2 acute fever secondary to above





3 acute leukocytosis secondary to above





4 obesity





5 possible obstructive sleep apnea on clinical ground although this has not 

been confirmed





Plan





Keep the patient on oxygen 2 L of humidified oxygen source.  Continued IV 

Zosyn.  Continued IV Solu-Medrol.  Patient is unable to swallow still.  Is 

improved yet he is not a to swallow and for that reason he was offered suction 

at the bedside to clears rest or secretions.  He is not aspirating getting at 

this point.  Still having some limited tachycardia yet afebrile for now.  

Continue monitoring the airway and there is no need for any particular airway 

intervention at this point.  ENT evaluation was appreciated.  We'll continue to 

follow make further recommendations based on his progress.  I'll obtain a 

baseline chest x-ray.

## 2018-03-29 NOTE — P.PN
Subjective


Progress Note Date: 03/29/18 (delayed charting seen at 0745)


Principal diagnosis: 


throat swelling





Patient is a 28-year-old  male with no significant past medical 

history who presented with upper respiratory symptoms for 2 days duration.  He 

had sore throat and pain with swallowing.  He then developed difficulty 

managing his own secretions and he presented to the emergency department.  On 

presentation he was febrile to 102.1 and tachycardic at 145, his other vital 

signs were within normal limits.  Laboratory analysis demonstrated an elevated 

white blood cell count of 23.8.  Group A strep was positive.  CT of the neck 

showed right submandibular salivary gland inflammation and cellulitis with 

narrowing of the hypopharyngeal airway with edema, and thickening of the 

epiglottis suggestive of epiglottitis.  No definitive abscess was seen.  ENT 

was contacted and patient was admitted to the ICU.  Dr. Martinez saw the 

patient and performed a flexible laryngoscopy which showed tonsillitis and 

epiglottitis.  He agreed with keeping the patient in the ICU for observation 

and continuing Zosyn and steroids.  Patient was monitored in ICU overnight 

tonight.  On the morning of 3/29 he stated his pain was improved and he was 

able to handle his own secretions much better.  He didn't given a trial of sips 

of water but stated he had to spit this out.





Patient seen and examined at bedside.  He reports that he has able to handle 

his own secretions better, he still had to spit out on her when he attempted to 

swallow.  The pain is much improved.  He no longer feels short of breath.  He 

no longer feels as if he is "going to die".








Objective





- Vital Signs


Vital signs: 


 Vital Signs











Temp  97.9 F   03/29/18 12:00


 


Pulse  97   03/29/18 13:00


 


Resp  14   03/29/18 13:00


 


BP  127/78   03/29/18 13:00


 


Pulse Ox  95   03/29/18 13:00








 Intake & Output











 03/28/18 03/29/18 03/29/18





 18:59 06:59 18:59


 


Intake Total  900 650


 


Output Total  340 400


 


Balance  560 250


 


Weight 127.006 kg 134.3 kg 134.3 kg


 


Intake:   


 


  IV  900 600


 


    Sodium Chloride 0.9% 1,  900 600





    000 ml @ 100 mls/hr IV .   





    Q10H [facility] Rx#:[id number]   


 


  Intake, IV Titration   50





  Amount   


 


    Piperacillin-Tazobactam 3   50





    .375 gm In Dextrose/Water   





    1 50ml.bag @ 12.5 mls/hr   





    IVPB ONCE STA Rx#:   





    [id number]   


 


Output:   


 


  Urine  340 400


 


Other:   


 


  Voiding Method  Urinal Urinal














- Exam


General: ill appearing, mild distress, appears at stated age


Derm: warm, dry


Head: atraumatic, normocephalic, symmetric


Eyes: EOMI, no lid lag, anicteric sclera


Mouth: no lip lesion, mucus membranes dry, unable to open enough to the 

hypopharynx, tongue without swelling, neck with diffuse right-sided swelling 

that is tender to palpation and not boggy with no crepitance


Cardiovascular: S1 and S2 tachycardic, no murmur, positive posterior tibial 

pulse bilateral, 


Lungs: CTA bilateral, no rhonchi, no rales , no accessory muscle use


Abdominal: soft,  nontender to palpation, no guarding, no appreciable 

organomegaly


Ext: no gross muscle atrophy, no edema, no contractures


Neuro:  CN II-XI grossly intact, no focal neuro deficits


Psych: Alert, oriented, appropriate affect 











- Labs


CBC & Chem 7: 


 03/29/18 05:10





 03/29/18 05:10


Labs: 


 Abnormal Lab Results - Last 24 Hours (Table)











  03/28/18 03/28/18 03/28/18 Range/Units





  19:21 19:21 19:21 


 


WBC  23.8 H    (3.8-10.6)  k/uL


 


Neutrophils #  22.0 H    (1.3-7.7)  k/uL


 


Lymphocytes #  0.5 L    (1.0-4.8)  k/uL


 


Carbon Dioxide   21 L   (22-30)  mmol/L


 


BUN     (9-20)  mg/dL


 


Glucose     (74-99)  mg/dL


 


POC Glucose (mg/dL)     (75-99)  mg/dL


 


Group A Strep Rapid    Positive A  (Negative)  














  03/28/18 03/29/18 03/29/18 Range/Units





  22:51 05:10 05:10 


 


WBC   25.1 H*   (3.8-10.6)  k/uL


 


Neutrophils #   23.5 H   (1.3-7.7)  k/uL


 


Lymphocytes #   0.7 L   (1.0-4.8)  k/uL


 


Carbon Dioxide    21 L  (22-30)  mmol/L


 


BUN    21 H  (9-20)  mg/dL


 


Glucose    130 H  (74-99)  mg/dL


 


POC Glucose (mg/dL)  100 H    (75-99)  mg/dL


 


Group A Strep Rapid     (Negative)  














Assessment and Plan


Assessment: 


group a strep tonsillitis and epiglottitis with sepsis


-Continue with Zosyn


-IV fluids


-ENT recommendations


-Critical care recommendations


-Maintain nothing by mouth status


-Continue with solumedrol


-Monitor for airway compromise 





Morbid obesity with BMI 37


-Outpatient structured weight loss





DVT prophylaxis: Heparin


Discussed with: Patient, nursing


Anticipated discharge: 48-72 hours


Anticipated discharge place: home


A total of 35 minutes was spent on the care of this complex patient more than 50

% of the time was spent in counseling and care coordination.

## 2018-03-30 LAB
ANION GAP SERPL CALC-SCNC: 13 MMOL/L
BUN SERPL-SCNC: 29 MG/DL (ref 9–20)
CALCIUM SPEC-MCNC: 9.6 MG/DL (ref 8.4–10.2)
CHLORIDE SERPL-SCNC: 110 MMOL/L (ref 98–107)
CO2 SERPL-SCNC: 24 MMOL/L (ref 22–30)
ERYTHROCYTE [DISTWIDTH] IN BLOOD BY AUTOMATED COUNT: 4.45 M/UL (ref 4.3–5.9)
ERYTHROCYTE [DISTWIDTH] IN BLOOD: 13 % (ref 11.5–15.5)
GLUCOSE BLD-MCNC: 107 MG/DL (ref 75–99)
GLUCOSE SERPL-MCNC: 120 MG/DL (ref 74–99)
HCT VFR BLD AUTO: 40.4 % (ref 39–53)
HGB BLD-MCNC: 13.4 GM/DL (ref 13–17.5)
MAGNESIUM SPEC-SCNC: 2.4 MG/DL (ref 1.6–2.3)
MCH RBC QN AUTO: 30 PG (ref 25–35)
MCHC RBC AUTO-ENTMCNC: 33.1 G/DL (ref 31–37)
MCV RBC AUTO: 90.8 FL (ref 80–100)
PLATELET # BLD AUTO: 280 K/UL (ref 150–450)
POTASSIUM SERPL-SCNC: 4.7 MMOL/L (ref 3.5–5.1)
SODIUM SERPL-SCNC: 147 MMOL/L (ref 137–145)
WBC # BLD AUTO: 26.8 K/UL (ref 3.8–10.6)

## 2018-03-30 PROCEDURE — 0CJS8ZZ INSPECTION OF LARYNX, VIA NATURAL OR ARTIFICIAL OPENING ENDOSCOPIC: ICD-10-PCS

## 2018-03-30 RX ADMIN — METHYLPREDNISOLONE SODIUM SUCCINATE SCH MG: 125 INJECTION, POWDER, FOR SOLUTION INTRAMUSCULAR; INTRAVENOUS at 06:33

## 2018-03-30 RX ADMIN — PANTOPRAZOLE SODIUM SCH MG: 40 INJECTION, POWDER, FOR SOLUTION INTRAVENOUS at 08:15

## 2018-03-30 RX ADMIN — DEXTROSE MONOHYDRATE SCH MLS/HR: 5 INJECTION, SOLUTION INTRAVENOUS at 16:43

## 2018-03-30 RX ADMIN — DEXTROSE MONOHYDRATE SCH MLS/HR: 5 INJECTION, SOLUTION INTRAVENOUS at 08:03

## 2018-03-30 RX ADMIN — SODIUM BICARBONATE SCH MLS/HR: 84 INJECTION, SOLUTION INTRAVENOUS at 17:11

## 2018-03-30 RX ADMIN — METHYLPREDNISOLONE SODIUM SUCCINATE SCH MG: 125 INJECTION, POWDER, FOR SOLUTION INTRAMUSCULAR; INTRAVENOUS at 17:04

## 2018-03-30 RX ADMIN — HEPARIN SODIUM SCH UNIT: 5000 INJECTION, SOLUTION INTRAVENOUS; SUBCUTANEOUS at 17:03

## 2018-03-30 RX ADMIN — HEPARIN SODIUM SCH: 5000 INJECTION, SOLUTION INTRAVENOUS; SUBCUTANEOUS at 02:10

## 2018-03-30 RX ADMIN — SODIUM BICARBONATE SCH MLS/HR: 84 INJECTION, SOLUTION INTRAVENOUS at 07:52

## 2018-03-30 RX ADMIN — CEFAZOLIN SCH MLS/HR: 330 INJECTION, POWDER, FOR SOLUTION INTRAMUSCULAR; INTRAVENOUS at 06:33

## 2018-03-30 RX ADMIN — METHYLPREDNISOLONE SODIUM SUCCINATE SCH MG: 125 INJECTION, POWDER, FOR SOLUTION INTRAMUSCULAR; INTRAVENOUS at 12:32

## 2018-03-30 RX ADMIN — HEPARIN SODIUM SCH UNIT: 5000 INJECTION, SOLUTION INTRAVENOUS; SUBCUTANEOUS at 08:13

## 2018-03-30 NOTE — P.PN
Subjective


Progress Note Date: 03/30/18


Principal diagnosis: 





Acute tonsillitis/supraglottitis, epiglottitis injury to acute streptococcal 

infection.





28-year-old obese male patient who presented to the hospital yesterday because 

of today history of sore throat and subsequently noted swelling in the right 

neck and progressive shortness of breath and difficulty in swallowing.  He also 

started losing his voice and he became hoarse and he was drooling.  He did have 

some emesis earlier.  He had subjectively feeling febrile .  In the burst 

department the patient was found to have leukocytosis with a white cell count 

of 25.  The rapid strep screen was positive.  He was started on Zosyn and he 

was given a dose of Decadron and subsequently was started on Solu-Medrol.  The 

CAT scan of the neck showed narrowing of the hypopharyngeal airway secondary to 

edema.  The hypopharyngeal airway was significantly narrowed due to the edema.  

There was thickening of the epiglottis suggestive of acute epiglottitis.  In 

the emergency the patient was seen by ENT and the flexible laryngoscopy that 

was done showed edematous and erythematous vocal cords, hypopharynx and the 

larynx were diffusely erythematous and there was evidence of epiglottitis which 

was taken approximately 6 mm in size and thickness and was diffuse 

inflammation.  There was some lingular tonsillar hypertrophy in addition.  The 

patient also had some erythema in the posterior oropharynx with no exudate.  No 

trismus.  Currently is in the intensive care unit.  Feeling better compared to 

yesterday.  Is able to speak longer sentences.  He has no stridor.  He remains 

on the same treatment.  Family is at the bedside.  No altered mentation.  No 

respiratory distress.





Patient is seen again today 03/30/2018 in follow-up in the intensive care unit.

  He is awake and alert in no acute distress.  He states he is feeling a lot 

better today as compared to yesterday.  His sore throat has subsided.  He is 

tolerating some clear liquids.  No fever chills or night sweats.  He is 

maintaining O2 saturations in the 90s on room air.  His been afebrile.  

Hemodynamically stable.  Blood culture revealing no growth.  White count 26.8.  

Hemoglobin 13.4.  Sodium 147.  Creatinine 0.90.  He remains on Zosyn and IV Solu

-Medrol.





Objective





- Vital Signs


Vital signs: 


 Vital Signs











Temp  97.5 F L  03/30/18 08:00


 


Pulse  103 H  03/30/18 10:00


 


Resp  12   03/30/18 10:00


 


BP  140/73   03/30/18 10:00


 


Pulse Ox  97   03/30/18 10:00








 Intake & Output











 03/29/18 03/30/18 03/30/18





 18:59 06:59 18:59


 


Intake Total 1150 1200 350


 


Output Total 1800 600 100


 


Balance -650 600 250


 


Weight 134.3 kg 130 kg 


 


Intake:   


 


  IV 1100 1200 100


 


    Sodium Chloride 0.9% 1, 1100 1200 100





    000 ml @ 100 mls/hr IV .   





    Q10H [facility] Rx#:[id number]   


 


  Intake, IV Titration 50  250





  Amount   


 


    Piperacillin-Tazobactam 3 50  





    .375 gm In Dextrose/Water   





    1 50ml.bag @ 12.5 mls/hr   





    IVPB ONCE STA Rx#:   





    [id number]   


 


    Piperacillin-Tazobactam 3   50





    .375 gm In Dextrose/Water   





    1 50ml.bag @ 12.5 mls/hr   





    IVPB Q8HR [facility] Rx#:   





    [id number]   


 


    Sodium Chloride 0.45% 1,   200





    000 ml @ 100 mls/hr IV .   





    Q10H [facility] Rx#:[id number]   


 


Output:   


 


  Urine 1800 600 100


 


Other:   


 


  Voiding Method Urinal Urinal 


 


  # Bowel Movements 1 1 














- Exam





GENERAL EXAM: Obese.  Alert, comfortable in no apparent distress.


HEAD: Normocephalic.


EYES: Normal reaction of pupils, equal size.


NOSE: Clear with pink turbinates.


THROAT: Ears crowding of the posterior pharynx.  Mallampati class IV. Still 

some erythema. 


NECK: Short.  No masses, no JVD.  Stridor.


CHEST: No chest wall deformity.


LUNGS: Equal air entry with no crackles, wheeze, rhonchi or dullness.


CVS: S1 and S2 normal with no audible murmur, regular rhythm.


ABDOMEN: Obese.  Soft.  No hepatosplenomegaly, normal bowel sounds, no guarding 

or rigidity.


SPINE: No scoliosis or deformity


SKIN: No rashes


CENTRAL NERVOUS SYSTEM: No focal deficits, tone is normal in all 4 extremities.


EXTREMITIES: There is no peripheral edema.  No clubbing, no cyanosis.  

Peripheral pulses are intact.





- Labs


CBC & Chem 7: 


 03/30/18 04:57





 03/30/18 04:57


Labs: 


 Abnormal Lab Results - Last 24 Hours (Table)











  03/30/18 03/30/18 Range/Units





  04:57 04:57 


 


WBC  26.8 H*   (3.8-10.6)  k/uL


 


Sodium   147 H  (137-145)  mmol/L


 


Chloride   110 H  ()  mmol/L


 


BUN   29 H  (9-20)  mg/dL


 


Glucose   120 H  (74-99)  mg/dL


 


Magnesium   2.4 H  (1.6-2.3)  mg/dL








 Microbiology - Last 24 Hours (Table)











 03/28/18 19:21 Blood Culture - Preliminary





 Blood    No Growth after 24 hours














Assessment and Plan


Assessment: 





Assessment





1 acute tonsillitis/supraglottitis, epiglottitis with secondary respiratory 

distress, this is secondary to a acute streptococcal infection.





2 acute fever secondary to above





3 acute leukocytosis secondary to above





4 obesity





5 possible obstructive sleep apnea on clinical ground although this has not 

been confirmed





Plan





The patient was seen and evaluated by Dr. Andersen.  He is currently stable 

from the pulmonary and critical care standpoint.  We'll continue with IV Zosyn 

and IV Solu-Medrol for now.  He is able to be transferred to regular medical 

floor.  Tolerating clear liquids.  We will continue to follow and make further 

recommendations based on his clinical status. 





I, the cosigning physician, performed a history & physical examination of the 

patient. Lungs sounds are clear.  Maintaining good O2 saturations in the 90s on 

room air.  I discussed the assessment and plan of care with my nurse 

practitioner, Lubna Barnett. I attest to the above note as dictated by her.

## 2018-03-30 NOTE — PCN
PROCEDURE NOTE



PREOPERATIVE DIAGNOSIS:

Supraglottis.



POSTOP DIAGNOSIS:

Supraglottitis.



PROCEDURE PERFORMED:

Flexible laryngoscopy.



ANESTHESIA:

Is none.



COMPLICATIONS:

None.



ESTIMATED BLOOD LOSS:

Blood loss none.



DESCRIPTION OF THE PROCEDURE:

The patient was in his hospital bed in the sitting position.  Flexible laryngoscopy was

performed with systematic evaluation of the left nasal cavity, nasopharynx, oropharynx,

hypopharynx and larynx with the above findings noted in the progress note.  The

laryngoscope was then withdrawn.  Patient tolerated procedure well with no

complications.





MMODL / IJN: [id number] / Job#: [id number]

## 2018-03-30 NOTE — P.PN
Subjective


Progress Note Date: 03/30/18


Principal diagnosis: 


throat swelling





Patient is a 28-year-old  male with no significant past medical 

history who presented with upper respiratory symptoms for 2 days duration.  He 

had sore throat and pain with swallowing.  He then developed difficulty 

managing his own secretions and he presented to the emergency department.  On 

presentation he was febrile to 102.1 and tachycardic at 145, his other vital 

signs were within normal limits.  Laboratory analysis demonstrated an elevated 

white blood cell count of 23.8.  Group A strep was positive.  CT of the neck 

showed right submandibular salivary gland inflammation and cellulitis with 

narrowing of the hypopharyngeal airway with edema, and thickening of the 

epiglottis suggestive of epiglottitis.  No definitive abscess was seen.  ENT 

was contacted and patient was admitted to the ICU.  Dr. Martinez saw the 

patient and performed a flexible laryngoscopy which showed tonsillitis and 

epiglottitis.  He agreed with keeping the patient in the ICU for observation 

and continuing Zosyn and steroids.  Patient was monitored in ICU overnight 

tonight.  On the morning of 3/29 he stated his pain was improved and he was 

able to handle his own secretions much better.  He was given a trial of sips of 

water but stated he had to spit this out. Hew was seen by ENT on 3/30 and again 

had flexible laryngoscopy which shows supraglottitis, and an improvement in 

visualization of the vocal cords. They recommended an additional 48 hours of IV 

abx and steroids. 





Patient seen and examined at bedside.  He states that he is hungry and has been 

able to handle sips of water. We discussed trying a Popsicle and then maybe 

jello. He is in agreement and is aware of the risk of choking resulting in 

ability to breath and need for intubation.  He is no longer short of breath. No 

nausea. No chest pain. No diarrhea. 








Objective





- Vital Signs


Vital signs: 


 Vital Signs











Temp  97.5 F L  03/30/18 08:00


 


Pulse  93   03/30/18 08:00


 


Resp  16   03/30/18 08:00


 


BP  137/80   03/30/18 08:00


 


Pulse Ox  94 L  03/30/18 08:00








 Intake & Output











 03/29/18 03/30/18 03/30/18





 18:59 06:59 18:59


 


Intake Total 1150 1200 100


 


Output Total 1800 600 100


 


Balance -650 600 0


 


Weight 134.3 kg 130 kg 


 


Intake:   


 


  IV 1100 1200 100


 


    Sodium Chloride 0.9% 1, 1100 1200 100





    000 ml @ 100 mls/hr IV .   





    Q10H [facility] Rx#:[id number]   


 


  Intake, IV Titration 50  





  Amount   


 


    Piperacillin-Tazobactam 3 50  





    .375 gm In Dextrose/Water   





    1 50ml.bag @ 12.5 mls/hr   





    IVPB ONCE STA Rx#:   





    [id number]   


 


Output:   


 


  Urine 1800 600 100


 


Other:   


 


  Voiding Method Urinal Urinal 


 


  # Bowel Movements 1 1 














- Exam


General: ill appearing, no distress, appears at stated age


Derm: warm, dry


Head: atraumatic, normocephalic, symmetric


Eyes: EOMI, no lid lag, anicteric sclera


Mouth: no lip lesion, mucus membranes dry, tonsilar edema no exudates , tongue 

without swelling, neck with diffuse right-sided swelling that is tender to 

palpation but decreased from yesterday


Cardiovascular: S1 and S2 reg, no murmur, positive posterior tibial pulse 

bilateral, 


Lungs: CTA bilateral, no rhonchi, no rales , no accessory muscle use


Abdominal: soft,  nontender to palpation, no guarding, no appreciable 

organomegaly


Ext: no gross muscle atrophy, no edema, no contractures


Neuro:  CN II-XI grossly intact, no focal neuro deficits


Psych: Alert, oriented, appropriate affect 











- Labs


CBC & Chem 7: 


 03/30/18 04:57





 03/30/18 04:57


Labs: 


 Abnormal Lab Results - Last 24 Hours (Table)











  03/30/18 03/30/18 Range/Units





  04:57 04:57 


 


WBC  26.8 H*   (3.8-10.6)  k/uL


 


Sodium   147 H  (137-145)  mmol/L


 


Chloride   110 H  ()  mmol/L


 


BUN   29 H  (9-20)  mg/dL


 


Glucose   120 H  (74-99)  mg/dL


 


Magnesium   2.4 H  (1.6-2.3)  mg/dL








 Microbiology - Last 24 Hours (Table)











 03/28/18 19:21 Blood Culture - Preliminary





 Blood    No Growth after 24 hours














Assessment and Plan


Assessment: 


group a strep tonsillitis and epiglottitis with sepsis


-Continue with Zosyn


-IV fluids to 0.45


-ENT recommendations


-Critical care recommendations


-clear liquid diet 


-Continue with solumedrol





Hypernatremia


- due to IVF, change to 0.45 NS


- recheck in AM





Morbid obesity with BMI 37


-Outpatient structured weight loss





Transfer to regular medical floor





DVT prophylaxis: Heparin


Discussed with: Patient, nursing,  


Anticipated discharge: 48-72 hours


Anticipated discharge place: home


A total of 35 minutes was spent on the care of this complex patient more than 50

% of the time was spent in counseling and care coordination.

## 2018-03-30 NOTE — PCN
PROCEDURE NOTE



HISTORY:

Patient has a history of strep positive tonsillitis and supraglottitis.  He has

improved to the point where he was able to be transferred to the regular floor today.

He is having no respiratory difficulty, but still some sore throat.  He is tolerating

oral fluids well at this point, and certainly all his own secretions now.  He is having

no cough or purulent sputum production.  He has been afebrile.  He is still on steroids

and Zosyn.



PHYSICAL EXAM:

Vital signs are stable.  He is afebrile with good oxygen saturation and vital signs

overall.  GENERAL:  The patient is sitting up in his hospital bed, in no acute

distress.  Voice just slight fullness to the voice, but no stridor and appears very

comfortable at this point.  He is tolerating oral fluids well.  HEENT:  HEAD

normocephalic and atraumatic.  Ears are clear.  Nasal septum deviated to the right. No

nasal congestion noted.  Oropharynx shows tonsils +3 without erythema or exudate.

Hypopharynx and larynx with flexible laryngoscopy shows still some mild to moderate

erythema of the epiglottis as well as supraglottis in general.  Vocal cords are

visualized and are mobile and the airway overall is improving.  NECK:  Supple with

shotty lymphadenopathy in the jugular chains bilaterally, right greater than left, with

only intermittent very minimal tenderness.



ASSESSMENT:

Strep positive acute tonsillitis and supraglottitis.



PLAN:

The patient is improving, although I would like to see improvement a little more

quickly.  We will continue his steroids at the same dosage.  I did add _____to the

Zosyn, knowing that this does have some redundancy on its spectrum of coverage but to

attempt to hasten the improvement with certainly some overlap of bacterial coverage,

but also some increased bacterial coverage with cefuroxime.  We will continue the Zosyn

as he has improved to this point also.  Still needs to be hospitalized at this point,

possibly for another couple of days on IV antibiotics prior to being able to be

discharged on oral medications.  If there are questions or concerns, please free to

contact me.





SARAH / DAYANA: [id number] / Job#: [id number]

## 2018-03-31 LAB
ANION GAP SERPL CALC-SCNC: 13 MMOL/L
BUN SERPL-SCNC: 29 MG/DL (ref 9–20)
CALCIUM SPEC-MCNC: 9.4 MG/DL (ref 8.4–10.2)
CHLORIDE SERPL-SCNC: 108 MMOL/L (ref 98–107)
CO2 SERPL-SCNC: 24 MMOL/L (ref 22–30)
ERYTHROCYTE [DISTWIDTH] IN BLOOD BY AUTOMATED COUNT: 4.48 M/UL (ref 4.3–5.9)
ERYTHROCYTE [DISTWIDTH] IN BLOOD: 13.2 % (ref 11.5–15.5)
GLUCOSE BLD-MCNC: 104 MG/DL (ref 75–99)
GLUCOSE BLD-MCNC: 113 MG/DL (ref 75–99)
GLUCOSE BLD-MCNC: 120 MG/DL (ref 75–99)
GLUCOSE BLD-MCNC: 99 MG/DL (ref 75–99)
GLUCOSE SERPL-MCNC: 160 MG/DL (ref 74–99)
HCT VFR BLD AUTO: 41 % (ref 39–53)
HGB BLD-MCNC: 13.4 GM/DL (ref 13–17.5)
MCH RBC QN AUTO: 29.9 PG (ref 25–35)
MCHC RBC AUTO-ENTMCNC: 32.7 G/DL (ref 31–37)
MCV RBC AUTO: 91.5 FL (ref 80–100)
PLATELET # BLD AUTO: 318 K/UL (ref 150–450)
POTASSIUM SERPL-SCNC: 4.6 MMOL/L (ref 3.5–5.1)
SODIUM SERPL-SCNC: 145 MMOL/L (ref 137–145)
WBC # BLD AUTO: 22.1 K/UL (ref 3.8–10.6)

## 2018-03-31 RX ADMIN — CEFUROXIME SCH MLS/HR: 750 INJECTION, POWDER, FOR SOLUTION INTRAMUSCULAR; INTRAVENOUS at 08:28

## 2018-03-31 RX ADMIN — METHYLPREDNISOLONE SODIUM SUCCINATE SCH MG: 125 INJECTION, POWDER, FOR SOLUTION INTRAMUSCULAR; INTRAVENOUS at 23:42

## 2018-03-31 RX ADMIN — METHYLPREDNISOLONE SODIUM SUCCINATE SCH MG: 125 INJECTION, POWDER, FOR SOLUTION INTRAMUSCULAR; INTRAVENOUS at 00:46

## 2018-03-31 RX ADMIN — HEPARIN SODIUM SCH UNIT: 5000 INJECTION, SOLUTION INTRAVENOUS; SUBCUTANEOUS at 00:46

## 2018-03-31 RX ADMIN — SODIUM BICARBONATE SCH: 84 INJECTION, SOLUTION INTRAVENOUS at 04:19

## 2018-03-31 RX ADMIN — METHYLPREDNISOLONE SODIUM SUCCINATE SCH MG: 125 INJECTION, POWDER, FOR SOLUTION INTRAMUSCULAR; INTRAVENOUS at 17:24

## 2018-03-31 RX ADMIN — HEPARIN SODIUM SCH UNIT: 5000 INJECTION, SOLUTION INTRAVENOUS; SUBCUTANEOUS at 16:14

## 2018-03-31 RX ADMIN — SODIUM BICARBONATE SCH MLS/HR: 84 INJECTION, SOLUTION INTRAVENOUS at 12:44

## 2018-03-31 RX ADMIN — CEFUROXIME SCH MLS/HR: 750 INJECTION, POWDER, FOR SOLUTION INTRAMUSCULAR; INTRAVENOUS at 16:13

## 2018-03-31 RX ADMIN — METHYLPREDNISOLONE SODIUM SUCCINATE SCH MG: 125 INJECTION, POWDER, FOR SOLUTION INTRAMUSCULAR; INTRAVENOUS at 12:44

## 2018-03-31 RX ADMIN — PANTOPRAZOLE SODIUM SCH MG: 40 INJECTION, POWDER, FOR SOLUTION INTRAVENOUS at 08:29

## 2018-03-31 RX ADMIN — METHYLPREDNISOLONE SODIUM SUCCINATE SCH MG: 125 INJECTION, POWDER, FOR SOLUTION INTRAMUSCULAR; INTRAVENOUS at 06:08

## 2018-03-31 RX ADMIN — CEFUROXIME SCH MLS/HR: 750 INJECTION, POWDER, FOR SOLUTION INTRAMUSCULAR; INTRAVENOUS at 23:42

## 2018-03-31 RX ADMIN — HEPARIN SODIUM SCH UNIT: 5000 INJECTION, SOLUTION INTRAVENOUS; SUBCUTANEOUS at 23:42

## 2018-03-31 RX ADMIN — HEPARIN SODIUM SCH UNIT: 5000 INJECTION, SOLUTION INTRAVENOUS; SUBCUTANEOUS at 08:28

## 2018-03-31 RX ADMIN — CEFUROXIME SCH MLS/HR: 750 INJECTION, POWDER, FOR SOLUTION INTRAMUSCULAR; INTRAVENOUS at 00:46

## 2018-03-31 NOTE — P.PN
Subjective


Progress Note Date: 03/31/18


Principal diagnosis: 


throat swelling





Patient is a 28-year-old  male with no significant past medical 

history who presented with upper respiratory symptoms for 2 days duration.  He 

had sore throat and pain with swallowing.  He then developed difficulty 

managing his own secretions and he presented to the emergency department.  On 

presentation he was febrile to 102.1 and tachycardic at 145, his other vital 

signs were within normal limits.  Laboratory analysis demonstrated an elevated 

white blood cell count of 23.8.  Group A strep was positive.  CT of the neck 

showed right submandibular salivary gland inflammation and cellulitis with 

narrowing of the hypopharyngeal airway with edema, and thickening of the 

epiglottis suggestive of epiglottitis.  No definitive abscess was seen.  ENT 

was contacted and patient was admitted to the ICU.  Dr. Martinez saw the 

patient and performed a flexible laryngoscopy which showed tonsillitis and 

epiglottitis.  He agreed with keeping the patient in the ICU for observation 

and continuing Zosyn and steroids.  Patient was monitored in ICU overnight 

tonight.  On the morning of 3/29 he stated his pain was improved and he was 

able to handle his own secretions much better.  He was given a trial of sips of 

water but stated he had to spit this out. Hew was seen by ENT on 3/30 and again 

had flexible laryngoscopy which shows supraglottitis, and an improvement in 

visualization of the vocal cords. They recommended an additional 48 hours of IV 

abx and steroids. He had significant improvement in his swelling by the morning 

of 3/31. 





Patient seen and examined at bedside.  Feeling much better today.  He reports 

that the pain in his throat is much improved.  He is able to swallow drinks 

easily and is asking for his diet.  Denies any nausea, vomiting, or diarrhea.








Objective





- Vital Signs


Vital signs: 


 Vital Signs











Temp  97.4 F L  03/31/18 07:00


 


Pulse  86   03/31/18 07:00


 


Resp  16   03/31/18 07:00


 


BP  144/86   03/31/18 07:00


 


Pulse Ox  94 L  03/31/18 07:00








 Intake & Output











 03/30/18 03/31/18 03/31/18





 18:59 06:59 18:59


 


Intake Total 350  


 


Output Total 100  


 


Balance 250  


 


Weight  130 kg 


 


Intake:   


 


    


 


    Sodium Chloride 0.9% 1, 100  





    000 ml @ 100 mls/hr IV .   





    Q10H [facility] Rx#:[id number]   


 


  Intake, IV Titration 250  





  Amount   


 


    Piperacillin-Tazobactam 3 50  





    .375 gm In Dextrose/Water   





    1 50ml.bag @ 12.5 mls/hr   





    IVPB Q8HR [facility] Rx#:   





    [id number]   


 


    Sodium Chloride 0.45% 1, 200  





    000 ml @ 100 mls/hr IV .   





    Q10H [facility] Rx#:[id number]   


 


Output:   


 


  Urine 100  


 


Other:   


 


  # Voids 3 1 


 


  # Bowel Movements  0 














- Exam


General: non toxic, no distress, appears at stated age


Derm: warm, dry


Head: atraumatic, normocephalic, symmetric


Eyes: EOMI, no lid lag, anicteric sclera


Mouth: no lip lesion, mucus membranes dry, tonsilar edema no exudates- 

improving , tongue without swelling, neck with diffuse right-sided swelling 

that is nontender to palpation


Cardiovascular: S1 and S2 reg, no murmur, positive posterior tibial pulse 

bilateral, 


Lungs: CTA bilateral, no rhonchi, no rales , no accessory muscle use


Abdominal: soft,  nontender to palpation, no guarding, no appreciable 

organomegaly


Ext: no gross muscle atrophy, no edema, no contractures


Neuro:  CN II-XI grossly intact, no focal neuro deficits


Psych: Alert, oriented, appropriate affect 











- Labs


CBC & Chem 7: 


 03/31/18 08:08





 03/31/18 08:08


Labs: 


 Abnormal Lab Results - Last 24 Hours (Table)











  03/30/18 03/31/18 03/31/18 Range/Units





  20:45 07:26 08:08 


 


WBC    22.1 H  (3.8-10.6)  k/uL


 


Chloride     ()  mmol/L


 


BUN     (9-20)  mg/dL


 


Glucose     (74-99)  mg/dL


 


POC Glucose (mg/dL)  107 H  113 H   (75-99)  mg/dL














  03/31/18 03/31/18 Range/Units





  08:08 12:05 


 


WBC    (3.8-10.6)  k/uL


 


Chloride  108 H   ()  mmol/L


 


BUN  29 H   (9-20)  mg/dL


 


Glucose  160 H   (74-99)  mg/dL


 


POC Glucose (mg/dL)   104 H  (75-99)  mg/dL








 Microbiology - Last 24 Hours (Table)











 03/28/18 19:21 Blood Culture - Preliminary





 Blood    No Growth after 48 hours














Assessment and Plan


Assessment: 


group a strep tonsillitis and epiglottitis with sepsis


-Continue with Zosyn and Cefuroxamine 


-IV fluids


-ENT recommendations


-Critical care recommendations


-increase to soft diet 


-Continue with solumedrol





Hypernatremia, resolved








Morbid obesity with BMI 37


-Outpatient structured weight loss








DVT prophylaxis: Heparin


Discussed with: Patient, nursing,


Anticipated discharge: 24-48 hours


Anticipated discharge place: home


A total of 35 minutes was spent on the care of this complex patient more than 50

% of the time was spent in counseling and care coordination.

## 2018-03-31 NOTE — PCN
PROCEDURE NOTE



PREOP DIAGNOSIS:

Supraglottitis.



POSTOP DIAGNOSIS:

Supraglottitis.



PROCEDURE:

Flexible laryngoscopy.



ANESTHESIA:

None.



COMPLICATIONS:

None.



BLOOD LOSS:

None.



FINDINGS:

See above noted consultation note.



DESCRIPTION OF PROCEDURE:

The patient was in his hospital bed.  Flexible laryngoscopy was performed with

systematic evaluation of the left nasal cavity, nasopharynx, oropharynx, hypopharynx

and larynx with the above-noted findings noted.  Laryngoscope was then withdrawn.

Patient tolerated it well.  No complications.



ADDENDUM:

Note that labs were reviewed and white blood cell count is decreasing at 22,000 now.  A

portion of this is likely due to steroids also.





MMODL / IJN: [id number] / Job#: [id number]

## 2018-03-31 NOTE — P.PN
Subjective


Progress Note Date: 03/31/18








28-year-old obese male patient who presented to the hospital yesterday because 

of today history of sore throat and subsequently noted swelling in the right 

neck and progressive shortness of breath and difficulty in swallowing.  He also 

started losing his voice and he became hoarse and he was drooling.  He did have 

some emesis earlier.  He had subjectively feeling febrile .  In the burst 

department the patient was found to have leukocytosis with a white cell count 

of 25.  The rapid strep screen was positive.  He was started on Zosyn and he 

was given a dose of Decadron and subsequently was started on Solu-Medrol.  The 

CAT scan of the neck showed narrowing of the hypopharyngeal airway secondary to 

edema.  The hypopharyngeal airway was significantly narrowed due to the edema.  

There was thickening of the epiglottis suggestive of acute epiglottitis.  In 

the emergency the patient was seen by ENT and the flexible laryngoscopy that 

was done showed edematous and erythematous vocal cords, hypopharynx and the 

larynx were diffusely erythematous and there was evidence of epiglottitis which 

was taken approximately 6 mm in size and thickness and was diffuse 

inflammation.  There was some lingular tonsillar hypertrophy in addition.  The 

patient also had some erythema in the posterior oropharynx with no exudate.  No 

trismus.  Currently is in the intensive care unit.  Feeling better compared to 

yesterday.  Is able to speak longer sentences.  He has no stridor.  He remains 

on the same treatment.  Family is at the bedside.  No altered mentation.  No 

respiratory distress.





Patient is seen again today 03/30/2018 in follow-up in the intensive care unit.

  He is awake and alert in no acute distress.  He states he is feeling a lot 

better today as compared to yesterday.  His sore throat has subsided.  He is 

tolerating some clear liquids.  No fever chills or night sweats.  He is 

maintaining O2 saturations in the 90s on room air.  His been afebrile.  

Hemodynamically stable.  Blood culture revealing no growth.  White count 26.8.  

Hemoglobin 13.4.  Sodium 147.  Creatinine 0.90.  He remains on Zosyn and IV Solu

-Medrol.





The patient is being seen in follow-up on 03/31/2018.  The patient is looking 

well.  His speech is improved.  His swallowing is improved.  No stridor.  No 

change in his voice quality.  No fever or chills.  The patient remains on 

cefuroxime IV and IV Solu-Medrol.  Chest x-ray was done showed no acute 

abnormalities.  The patient has obvious features of obstructive sleep apnea 

that needs to be followed up on outpatient basis.  Meanwhile, the white cell 

count is dropped down to 22.  The rest of the electrodes are within normal 

limits.  The patient needs to ambulate.  He is currently on clear liquid diet.  





Objective





- Vital Signs


Vital signs: 


 Vital Signs











Temp  97.4 F L  03/31/18 07:00


 


Pulse  86   03/31/18 07:00


 


Resp  16   03/31/18 07:00


 


BP  144/86   03/31/18 07:00


 


Pulse Ox  94 L  03/31/18 07:00








 Intake & Output











 03/30/18 03/31/18 03/31/18





 18:59 06:59 18:59


 


Intake Total 350  


 


Output Total 100  


 


Balance 250  


 


Weight  130 kg 


 


Intake:   


 


    


 


    Sodium Chloride 0.9% 1, 100  





    000 ml @ 100 mls/hr IV .   





    Q10H [facility] Rx#:[id number]   


 


  Intake, IV Titration 250  





  Amount   


 


    Piperacillin-Tazobactam 3 50  





    .375 gm In Dextrose/Water   





    1 50ml.bag @ 12.5 mls/hr   





    IVPB Q8HR [facility] Rx#:   





    [id number]   


 


    Sodium Chloride 0.45% 1, 200  





    000 ml @ 100 mls/hr IV .   





    Q10H [facility] Rx#:[id number]   


 


Output:   


 


  Urine 100  


 


Other:   


 


  # Voids 3 1 


 


  # Bowel Movements  0 














- Exam








GENERAL EXAM: Obese.  Alert, comfortable in no apparent distress.


HEAD: Normocephalic.


EYES: Normal reaction of pupils, equal size.


NOSE: Clear with pink turbinates.


THROAT: Ears crowding of the posterior pharynx.  Mallampati class IV. Still 

some erythema. 


NECK: Short.  No masses, no JVD.  Stridor.


CHEST: No chest wall deformity.


LUNGS: Equal air entry with no crackles, wheeze, rhonchi or dullness.


CVS: S1 and S2 normal with no audible murmur, regular rhythm.


ABDOMEN: Obese.  Soft.  No hepatosplenomegaly, normal bowel sounds, no guarding 

or rigidity.


SPINE: No scoliosis or deformity


SKIN: No rashes


CENTRAL NERVOUS SYSTEM: No focal deficits, tone is normal in all 4 extremities.


EXTREMITIES: There is no peripheral edema.  No clubbing, no cyanosis.  

Peripheral pulses are intact.





- Labs


CBC & Chem 7: 


 03/31/18 08:08





 03/31/18 08:08


Labs: 


 Abnormal Lab Results - Last 24 Hours (Table)











  03/30/18 03/31/18 03/31/18 Range/Units





  20:45 07:26 08:08 


 


WBC    22.1 H  (3.8-10.6)  k/uL


 


Chloride     ()  mmol/L


 


BUN     (9-20)  mg/dL


 


Glucose     (74-99)  mg/dL


 


POC Glucose (mg/dL)  107 H  113 H   (75-99)  mg/dL














  03/31/18 03/31/18 Range/Units





  08:08 12:05 


 


WBC    (3.8-10.6)  k/uL


 


Chloride  108 H   ()  mmol/L


 


BUN  29 H   (9-20)  mg/dL


 


Glucose  160 H   (74-99)  mg/dL


 


POC Glucose (mg/dL)   104 H  (75-99)  mg/dL








 Microbiology - Last 24 Hours (Table)











 03/28/18 19:21 Blood Culture - Preliminary





 Blood    No Growth after 48 hours














Assessment and Plan


Plan: 





Assessment





1 acute tonsillitis/supraglottitis, epiglottitis with secondary respiratory 

distress, this is secondary to a acute streptococcal infection.  The patient is 

currently on IV Solu-Medrol and IV cefuroxime.





2 acute fever secondary to above, recovered





3 acute leukocytosis secondary to above, improving





4 obesity





5 possible obstructive sleep apnea on clinical ground although this has not 

been confirmed





Plan





Marked improvement in symptoms of supraglottitis and epiglottitis.  Continued 

IV Solu Medrol for another 24 hours.  Continue cefuroxime for another 24 hours 

and IV form and switch him to oral antibiotics and oral steroids as of 

tomorrow.  Ambulate the hallway.  Advance diet.  We'll follow.

## 2018-03-31 NOTE — PN
PROGRESS NOTE



HISTORY:

The patient is admitted for strep positive tonsillitis and supraglottitis.  Patient

continues to improve.  He has tried some soft foods today including pudding which he

was able to tolerate some and is tolerating oral liquids well.  He has had no

respiratory difficulty.  He remains afebrile.  He does continue to have some dysphagia,

but greatly improved.  His voice is also normalized.  He has only mild sore throat now.

He is not taking anything as far as narcotic analgesics.



PHYSICAL EXAM:

Vital signs:  He is afebrile and vital signs are otherwise stable.  GENERAL:  The

patient is sitting upright in his bed without any hoarseness, stridor, or difficulty

with swallowing grossly.  He is in no acute distress.  Appears comfortable.  HEENT:

HEAD:  Normocephalic, atraumatic.  Ears clear.  Canals are clear.  Tympanic membranes

unremarkable mobile.  Nose shows septum deviated to the right with mild inferior right

congestion bilaterally.  No drainage is noted.  Mouth and throat shows the tonsils +3

without erythema or exudate.  Good oropharyngeal airway.  Hypopharynx and larynx with

flexible laryngoscopy shows only mild erythema of the epiglottis as well as edema.

This has improved.  There is still some supraglottic swelling and irregularity of the

mucosa of the right aryepiglottic fold.  The vocal cords are well visualized and are

unremarkable and mobile.  Airway continues to improve.  NECK:  Supple without

adenopathy or tenderness.



ASSESSMENT:

Acute tonsillitis and supraglottitis-improved.



PLAN:

The patient will be continued on the current medications including steroids and

antibiotics.  He is improving.  He will continue to advance diet.  He may be able to be

discharged as early as tomorrow, but this would depend on his clinical course.  I

reviewed this with him today.  If you have any questions or concerns, please free to

contact me.  30 minutes spent on consultation with over 50% of this time in counseling.





MMODL / IJN: [id number] / Job#: [id number]

## 2018-04-01 VITALS
RESPIRATION RATE: 16 BRPM | DIASTOLIC BLOOD PRESSURE: 90 MMHG | TEMPERATURE: 96.9 F | SYSTOLIC BLOOD PRESSURE: 140 MMHG | HEART RATE: 54 BPM

## 2018-04-01 LAB
ANION GAP SERPL CALC-SCNC: 12 MMOL/L
BUN SERPL-SCNC: 29 MG/DL (ref 9–20)
CALCIUM SPEC-MCNC: 9.2 MG/DL (ref 8.4–10.2)
CHLORIDE SERPL-SCNC: 106 MMOL/L (ref 98–107)
CO2 SERPL-SCNC: 24 MMOL/L (ref 22–30)
ERYTHROCYTE [DISTWIDTH] IN BLOOD BY AUTOMATED COUNT: 4.88 M/UL (ref 4.3–5.9)
ERYTHROCYTE [DISTWIDTH] IN BLOOD: 13.1 % (ref 11.5–15.5)
GLUCOSE BLD-MCNC: 105 MG/DL (ref 75–99)
GLUCOSE BLD-MCNC: 113 MG/DL (ref 75–99)
GLUCOSE SERPL-MCNC: 117 MG/DL (ref 74–99)
HCT VFR BLD AUTO: 44.6 % (ref 39–53)
HGB BLD-MCNC: 14.3 GM/DL (ref 13–17.5)
MCH RBC QN AUTO: 29.2 PG (ref 25–35)
MCHC RBC AUTO-ENTMCNC: 32 G/DL (ref 31–37)
MCV RBC AUTO: 91.2 FL (ref 80–100)
PLATELET # BLD AUTO: 362 K/UL (ref 150–450)
POTASSIUM SERPL-SCNC: 5 MMOL/L (ref 3.5–5.1)
SODIUM SERPL-SCNC: 142 MMOL/L (ref 137–145)
WBC # BLD AUTO: 22.7 K/UL (ref 3.8–10.6)

## 2018-04-01 RX ADMIN — HEPARIN SODIUM SCH UNIT: 5000 INJECTION, SOLUTION INTRAVENOUS; SUBCUTANEOUS at 08:21

## 2018-04-01 RX ADMIN — SODIUM BICARBONATE SCH MLS/HR: 84 INJECTION, SOLUTION INTRAVENOUS at 08:22

## 2018-04-01 RX ADMIN — CEFUROXIME SCH MLS/HR: 750 INJECTION, POWDER, FOR SOLUTION INTRAMUSCULAR; INTRAVENOUS at 08:21

## 2018-04-01 RX ADMIN — SODIUM BICARBONATE SCH: 84 INJECTION, SOLUTION INTRAVENOUS at 04:38

## 2018-04-01 RX ADMIN — METHYLPREDNISOLONE SODIUM SUCCINATE SCH MG: 125 INJECTION, POWDER, FOR SOLUTION INTRAMUSCULAR; INTRAVENOUS at 05:22

## 2018-04-01 RX ADMIN — PANTOPRAZOLE SODIUM SCH MG: 40 INJECTION, POWDER, FOR SOLUTION INTRAVENOUS at 08:22

## 2018-04-01 NOTE — PN
PROGRESS NOTE



HISTORY:

The patient was admitted for supraglottitis and tonsillitis.  He has progressively

improved.  He is able to tolerate soft foods now, is having no respiratory difficulty.

He is afebrile.  He has been evaluated by his internist and pulmonologist and they felt

from their standpoint that he would be able to be discharged.  He feels that his voice

has normalized also.



EXAM:

The patient is afebrile with stable vital signs.  _____ walking in the gale and as soon

as possible without _____.  Voice is normal. _____ no exudate. _____ mild congestion.

No strabismus. Hypopharynx and larynx exams shows epiglottis is now normalized. There

is still some mild inflammation of the right supraglottis with minimal exudate, but

good overall airway and vocal cords are well visualized. Normal vocal cord mobility. No

stridor or hoarseness.  Neck is supple without adenopathy or tenderness.



ASSESSMENT:

1. Acute tonsillitis.

2. Acute supraglottitis.



PLAN:

Patient is overall improved and stable to be able to be discharged on oral antibiotics

as well as oral steroids. He will be on Augmentin.  He will be following up with Dr. Andersen for repeat CBC as well as ultimately to workup possible sleep apnea.  He is to

call my office tomorrow to schedule an appointment later in the week for recheck of his

_____.  If he has any difficulties with worsening symptoms in the meantime, he is to

contact me via the office.  Approximately 20 minutes was spent with examination today,

with over 50% of this in counseling.



PROCEDURE NOTE:



PREOPERATIVE DIAGNOSIS:

Supraglottitis.



POSTOPERATIVE DIAGNOSIS:

Supraglottitis.



PROCEDURE PERFORMED:

Flexible laryngoscopy.



ANESTHESIA:

None.



COMPLICATIONS:

None.



ESTIMATED BLOOD LOSS:

None.



FINDINGS:

See consultation note as above.



PROCEDURE:

The patient was in his hospital bed in sitting position. Flexible laryngoscopy was

performed through the right nasal cavity with systematic evaluation of the right nasal

cavity, nasopharynx, oropharynx, hypopharynx and larynx with the above findings noted.

The laryngoscope was withdrawn.  The patient tolerated it well.  No complications.





MMODL / IJN: [id number] / Job#: [id number]

## 2018-04-01 NOTE — P.PN
Subjective


Progress Note Date: 04/01/18


Principal diagnosis: 


throat swelling





Patient is a 28-year-old  male with no significant past medical 

history who presented with upper respiratory symptoms for 2 days duration.  He 

had sore throat and pain with swallowing.  He then developed difficulty 

managing his own secretions and he presented to the emergency department.  On 

presentation he was febrile to 102.1 and tachycardic at 145, his other vital 

signs were within normal limits.  Laboratory analysis demonstrated an elevated 

white blood cell count of 23.8.  Group A strep was positive.  CT of the neck 

showed right submandibular salivary gland inflammation and cellulitis with 

narrowing of the hypopharyngeal airway with edema, and thickening of the 

epiglottis suggestive of epiglottitis.  No definitive abscess was seen.  ENT 

was contacted and patient was admitted to the ICU.  Dr. Martinez saw the 

patient and performed a flexible laryngoscopy which showed tonsillitis and 

epiglottitis.  He agreed with keeping the patient in the ICU for observation 

and continuing Zosyn and steroids.  Patient was monitored in ICU overnight 

tonight.  On the morning of 3/29 he stated his pain was improved and he was 

able to handle his own secretions much better.  He was given a trial of sips of 

water but stated he had to spit this out. Hew was seen by ENT on 3/30 and again 

had flexible laryngoscopy which shows supraglottitis, and an improvement in 

visualization of the vocal cords. They recommended an additional 48 hours of IV 

abx and steroids. He had significant improvement in his swelling by the morning 

of 3/31. He was able to tolerate a soft diet. 





Patient seen and examined at bedside.  Feeling well has been walking wants to 

go home. He ate a sandwhich yesterday. No chest pain, SOB, nausea, vomiting, 

diarrhea. Discussed that his WBC cound is still quite elevated and he has still 

be on steroids. He also states that his PCP is difficult to get into and he 

does not think he can get an appointment next week. 





Objective





- Vital Signs


Vital signs: 


 Vital Signs











Temp  96.9 F L  04/01/18 06:02


 


Pulse  54 L  04/01/18 06:02


 


Resp  16   04/01/18 06:02


 


BP  140/90   04/01/18 06:02


 


Pulse Ox  93 L  04/01/18 06:02








 Intake & Output











 03/31/18 04/01/18 04/01/18





 18:59 06:59 18:59


 


Intake Total 1050  


 


Balance 1050  


 


Weight  146.5 kg 


 


Intake:   


 


  Intake, IV Titration 50  





  Amount   


 


    Cefuroxime 1,500 mg In 50  





    Sodium Chloride 0.9% 50   





    ml @ 100 mls/hr IVPB Q8HR   





    [facility] Rx#:[id number]   


 


  Oral 1000  


 


Other:   


 


  # Voids 4 1 














- Exam


General: non toxic, no distress, appears at stated age


Derm: warm, dry


Head: atraumatic, normocephalic, symmetric


Eyes: EOMI, no lid lag, anicteric sclera


Mouth: no lip lesion, mucus membranes dry, tno tonsillar edema , tongue without 

swelling, neck with diffuse right-sided swelling that is nontender to palpation


Cardiovascular: S1 and S2 reg, no murmur, positive posterior tibial pulse 

bilateral, 


Lungs: CTA bilateral, no rhonchi, no rales , no accessory muscle use


Abdominal: soft,  nontender to palpation, no guarding, no appreciable 

organomegaly


Ext: no gross muscle atrophy, no edema, no contractures


Neuro:  CN II-XI grossly intact, no focal neuro deficits


Psych: Alert, oriented, appropriate affect 











- Labs


CBC & Chem 7: 


 04/01/18 07:41





 04/01/18 07:41


Labs: 


 Abnormal Lab Results - Last 24 Hours (Table)











  03/31/18 03/31/18 04/01/18 Range/Units





  12:05 16:47 07:07 


 


WBC     (3.8-10.6)  k/uL


 


BUN     (9-20)  mg/dL


 


Glucose     (74-99)  mg/dL


 


POC Glucose (mg/dL)  104 H  120 H  113 H  (75-99)  mg/dL














  04/01/18 04/01/18 Range/Units





  07:41 07:41 


 


WBC  22.7 H   (3.8-10.6)  k/uL


 


BUN   29 H  (9-20)  mg/dL


 


Glucose   117 H  (74-99)  mg/dL


 


POC Glucose (mg/dL)    (75-99)  mg/dL








 Microbiology - Last 24 Hours (Table)











 03/28/18 19:21 Blood Culture - Preliminary





 Blood    No Growth after 72 hours














Assessment and Plan


Assessment: 


group a strep tonsillitis and epiglottitis with sepsis


- still with elevated WBC count likely combination of steroids and infection, 


-Continue with Zosyn and Cefuroxamine 


-IV fluids compelted


-ENT recommendations


-soft diet


-solumedrol change to prednisone. 





Leukocytosis


- would like to see further down trending prior to discharge


- likely related to steroids and infection


- needs recheck on discharge to ensure resolution 





Hypernatremia, resolved








Morbid obesity with BMI 37


-Outpatient structured weight loss





Autism spectrum disorder


- doesn't like being told information more than once 





Likely home in AM if WBC count decreasing





DVT prophylaxis: Heparin


Discussed with: Patient, nursing,


Anticipated discharge: 24 hours


Anticipated discharge place: home


A total of 35 minutes was spent on the care of this complex patient more than 50

% of the time was spent in counseling and care coordination.

## 2018-04-01 NOTE — P.PN
Subjective


Progress Note Date: 04/01/18








28-year-old obese male patient who presented to the hospital yesterday because 

of today history of sore throat and subsequently noted swelling in the right 

neck and progressive shortness of breath and difficulty in swallowing.  He also 

started losing his voice and he became hoarse and he was drooling.  He did have 

some emesis earlier.  He had subjectively feeling febrile .  In the burst 

department the patient was found to have leukocytosis with a white cell count 

of 25.  The rapid strep screen was positive.  He was started on Zosyn and he 

was given a dose of Decadron and subsequently was started on Solu-Medrol.  The 

CAT scan of the neck showed narrowing of the hypopharyngeal airway secondary to 

edema.  The hypopharyngeal airway was significantly narrowed due to the edema.  

There was thickening of the epiglottis suggestive of acute epiglottitis.  In 

the emergency the patient was seen by ENT and the flexible laryngoscopy that 

was done showed edematous and erythematous vocal cords, hypopharynx and the 

larynx were diffusely erythematous and there was evidence of epiglottitis which 

was taken approximately 6 mm in size and thickness and was diffuse 

inflammation.  There was some lingular tonsillar hypertrophy in addition.  The 

patient also had some erythema in the posterior oropharynx with no exudate.  No 

trismus.  Currently is in the intensive care unit.  Feeling better compared to 

yesterday.  Is able to speak longer sentences.  He has no stridor.  He remains 

on the same treatment.  Family is at the bedside.  No altered mentation.  No 

respiratory distress.





Patient is seen again today 03/30/2018 in follow-up in the intensive care unit.

  He is awake and alert in no acute distress.  He states he is feeling a lot 

better today as compared to yesterday.  His sore throat has subsided.  He is 

tolerating some clear liquids.  No fever chills or night sweats.  He is 

maintaining O2 saturations in the 90s on room air.  His been afebrile.  

Hemodynamically stable.  Blood culture revealing no growth.  White count 26.8.  

Hemoglobin 13.4.  Sodium 147.  Creatinine 0.90.  He remains on Zosyn and IV Solu

-Medrol.





The patient is being seen in follow-up on 03/31/2018.  The patient is looking 

well.  His speech is improved.  His swallowing is improved.  No stridor.  No 

change in his voice quality.  No fever or chills.  The patient remains on 

cefuroxime IV and IV Solu-Medrol.  Chest x-ray was done showed no acute 

abnormalities.  The patient has obvious features of obstructive sleep apnea 

that needs to be followed up on outpatient basis.  Meanwhile, the white cell 

count is dropped down to 22.  The rest of the electrodes are within normal 

limits.  The patient needs to ambulate.  He is currently on clear liquid diet.  





On 4 1018, the patient is much improved.  He is ambulating.  He is talking.  

His swallowing fine.  No stridor.  No fever.  White cell count is still at 22.





Objective





- Vital Signs


Vital signs: 


 Vital Signs











Temp  96.9 F L  04/01/18 06:02


 


Pulse  54 L  04/01/18 06:02


 


Resp  16   04/01/18 06:02


 


BP  140/90   04/01/18 06:02


 


Pulse Ox  93 L  04/01/18 06:02








 Intake & Output











 03/31/18 04/01/18 04/01/18





 18:59 06:59 18:59


 


Intake Total 1050  


 


Balance 1050  


 


Weight  146.5 kg 


 


Intake:   


 


  Intake, IV Titration 50  





  Amount   


 


    Cefuroxime 1,500 mg In 50  





    Sodium Chloride 0.9% 50   





    ml @ 100 mls/hr IVPB Q8HR   





    [facility] Rx#:[id number]   


 


  Oral 1000  


 


Other:   


 


  # Voids 4 1 














- Exam








GENERAL EXAM: Obese.  Alert, comfortable in no apparent distress.


HEAD: Normocephalic.


EYES: Normal reaction of pupils, equal size.


NOSE: Clear with pink turbinates.


THROAT: Ears crowding of the posterior pharynx.  Mallampati class IV. Still 

some erythema. 


NECK: Short.  No masses, no JVD.  Stridor.


CHEST: No chest wall deformity.


LUNGS: Equal air entry with no crackles, wheeze, rhonchi or dullness.


CVS: S1 and S2 normal with no audible murmur, regular rhythm.


ABDOMEN: Obese.  Soft.  No hepatosplenomegaly, normal bowel sounds, no guarding 

or rigidity.


SPINE: No scoliosis or deformity


SKIN: No rashes


CENTRAL NERVOUS SYSTEM: No focal deficits, tone is normal in all 4 extremities.


EXTREMITIES: There is no peripheral edema.  No clubbing, no cyanosis.  

Peripheral pulses are intact.





- Labs


CBC & Chem 7: 


 04/01/18 07:41





 04/01/18 07:41


Labs: 


 Abnormal Lab Results - Last 24 Hours (Table)











  03/31/18 04/01/18 04/01/18 Range/Units





  16:47 07:07 07:41 


 


WBC    22.7 H  (3.8-10.6)  k/uL


 


BUN     (9-20)  mg/dL


 


Glucose     (74-99)  mg/dL


 


POC Glucose (mg/dL)  120 H  113 H   (75-99)  mg/dL














  04/01/18 04/01/18 Range/Units





  07:41 12:18 


 


WBC    (3.8-10.6)  k/uL


 


BUN  29 H   (9-20)  mg/dL


 


Glucose  117 H   (74-99)  mg/dL


 


POC Glucose (mg/dL)   105 H  (75-99)  mg/dL








 Microbiology - Last 24 Hours (Table)











 03/28/18 19:21 Blood Culture - Preliminary





 Blood    No Growth after 72 hours














Assessment and Plan


Plan: 





Assessment





1 acute tonsillitis/supraglottitis, epiglottitis with secondary respiratory 

distress, this is secondary to a acute streptococcal infection.  The patient is 

currently on IV Solu-Medrol and IV cefuroxime.  On today's evaluation of 4 1018 

the patient is fully recovered and only active issue for now is some residual 

leukocytosis.





2 acute fever secondary to above, recovered





3 acute leukocytosis secondary to above, improving





4 obesity





5 possible obstructive sleep apnea on clinical ground although this has not 

been confirmed





Plan





Marked improvement in symptoms of supraglottitis and epiglottitis.  Discharge 

this patient home on Augmentin and Motrin and prednisone burst taper.  Follow-

up with me in the office tomorrow with a white cell count and CBC.  Outpatient 

sleep study at a later stage.

## 2018-04-01 NOTE — P.DS
Providers


Date of admission: 


03/28/18 21:36





Expected date of discharge: 04/01/18


Attending physician: 


Jass Hamilton MD





Consults: 





 





03/28/18 21:39


Consult Physician Stat 


   Consulting Provider: Collette Andersen


   Consult Reason/Comments: Critical Care Management


   Do you want consulting provider notified?: Already Contacted


Consult Physician Stat 


   Consulting Provider: Husam Golden


   Consult Reason/Comments: Epiglottitis; Salivary gland infection; Strep 

Pharyngitis


   Do you want consulting provider notified?: Already Contacted











Primary care physician: 


Tom Cordero








- Discharge Diagnosis(es)


(1) Sepsis


Current Visit: Yes   Status: Acute   





(2) Strep pharyngitis


Current Visit: Yes   Status: Acute   





(3) Epiglottitis


Current Visit: Yes   Status: Acute   





(4) Hypernatremia


Current Visit: Yes   Status: Acute   





(5) Salivary gland infection


Current Visit: Yes   Status: Acute   





(6) Morbid obesity


Current Visit: Yes   Status: Acute   


Hospital Course: 


Patient is a 28-year-old  male with no significant past medical 

history who presented with upper respiratory symptoms for 2 days duration.  He 

had sore throat and pain with swallowing.  He then developed difficulty 

managing his own secretions and he presented to the emergency department.  On 

presentation he was febrile to 102.1 and tachycardic at 145, his other vital 

signs were within normal limits.  Laboratory analysis demonstrated an elevated 

white blood cell count of 23.8.  Group A strep was positive.  CT of the neck 

showed right submandibular salivary gland inflammation and cellulitis with 

narrowing of the hypopharyngeal airway with edema, and thickening of the 

epiglottis suggestive of epiglottitis.  No definitive abscess was seen.  ENT 

was contacted and patient was admitted to the ICU.  Dr. Martinez saw the 

patient and performed a flexible laryngoscopy which showed tonsillitis and 

epiglottitis.  He agreed with keeping the patient in the ICU for observation 

and continuing Zosyn and steroids.  Patient was monitored in ICU overnight 

tonight.  On the morning of 3/29 he stated his pain was improved and he was 

able to handle his own secretions much better.  He was given a trial of sips of 

water but stated he had to spit this out. Hew was seen by ENT on 3/30 and again 

had flexible laryngoscopy which shows supraglottitis, and an improvement in 

visualization of the vocal cords. They recommended an additional 48 hours of IV 

abx and steroids. He had significant improvement in his swelling by the morning 

of 3/31. He was able to tolerate a soft diet.  He was seen by Dr. Andersen who 

will follow with him in the office to ensure that his WBC count goes down and 

that he is set up for sleep apnea evaluation. He was again seen by ENT 





He will complete a course of augment and prednisone. He does need his WBC count 

closely followed to ensure that it improves. He will be given a prescription 

for motrin as well. 





A total of 35 minutes was spent on the discharge summary of this complex 

patient. 





Pertinent Studies: 


Enlarged right submandibular salivary gland with surrounding fat stranding 

consistent with acute inflammatory process and cellulitis along with bilateral 

tonsillar enlargement and narrowing of the hypopharyngeal airway.  Also 

suggestive of epiglottitis.





Procedures: 


Multiple bedside flexible laryngoscopy 





Patient Condition at Discharge: Serious





Plan - Discharge Summary


Discharge Rx Participant: Yes


New Discharge Prescriptions: 


New


   Amoxic-Pot Clav 875-125Mg [Augmentin 875-125] 1 tab PO Q12HR #20 tablet


   Ibuprofen [Motrin] 600 mg PO Q8HR PRN #45 tab


     PRN Reason: Pain


   predniSONE 0 mg PO AS DIRECTED #12 tab


Discharge Medication List





Amoxic-Pot Clav 875-125Mg [Augmentin 875-125] 1 tab PO Q12HR #20 tablet 04/01/ 18 [Rx]


Ibuprofen [Motrin] 600 mg PO Q8HR PRN #45 tab 04/01/18 [Rx]


predniSONE 0 mg PO AS DIRECTED #12 tab 04/01/18 [Rx]








Follow up Appointment(s)/Referral(s): 


Tom Cordero MD [Primary Care Provider] - 1-2 days


Husam Golden MD [STAFF PHYSICIAN] - 3 Days


Collette Andersen MD [STAFF PHYSICIAN] - 04/02/18 3:00 pm


Ambulatory/Diagnostic Orders: 


Complete Blood Count w/diff [LAB.AMB] Location: Determined By Patient


Activity/Diet/Wound Care/Special Instructions: 


soft diet


activity as tolerate





Get blood work done tomorrow morning and then go to Dr. Andersen's office 

tomorrow afternoon

## 2018-04-02 ENCOUNTER — HOSPITAL ENCOUNTER (OUTPATIENT)
Dept: HOSPITAL 47 - LABWHC1 | Age: 28
Discharge: HOME | End: 2018-04-02
Payer: SELF-PAY

## 2018-04-02 DIAGNOSIS — J05.10: Primary | ICD-10-CM

## 2018-04-02 LAB
CELLS COUNTED: 200
ERYTHROCYTE [DISTWIDTH] IN BLOOD BY AUTOMATED COUNT: 5.3 M/UL (ref 4.3–5.9)
ERYTHROCYTE [DISTWIDTH] IN BLOOD: 12.9 % (ref 11.5–15.5)
HCT VFR BLD AUTO: 47 % (ref 39–53)
HGB BLD-MCNC: 15.4 GM/DL (ref 13–17.5)
LYMPHOCYTES # BLD MANUAL: 2.28 K/UL (ref 1–4.8)
MCH RBC QN AUTO: 29 PG (ref 25–35)
MCHC RBC AUTO-ENTMCNC: 32.7 G/DL (ref 31–37)
MCV RBC AUTO: 88.7 FL (ref 80–100)
METAMYELOCYTES # BLD: 0.51 K/UL
MONOCYTES # BLD MANUAL: 1.01 K/UL (ref 0–1)
MYELOCYTES # BLD MANUAL: 0.51 K/UL
NEUTROPHILS NFR BLD MANUAL: 80 %
NEUTS SEG # BLD MANUAL: 21.5 K/UL (ref 1.3–7.7)
PLATELET # BLD AUTO: 421 K/UL (ref 150–450)
WBC # BLD AUTO: 25.3 K/UL (ref 3.8–10.6)

## 2018-04-02 PROCEDURE — 85025 COMPLETE CBC W/AUTO DIFF WBC: CPT

## 2018-04-02 PROCEDURE — 36415 COLL VENOUS BLD VENIPUNCTURE: CPT

## 2018-04-02 PROCEDURE — 36416 COLLJ CAPILLARY BLOOD SPEC: CPT

## 2020-11-10 NOTE — PN
Called and left message regarding MRI tomorrow. I left instructions and a call back number if there are any questions   PROGRESS NOTE



.



CHIEF COMPLAINT:

Followup on tonsillitis and supraglottitis.



HISTORY:

The patient was admitted last night for tonsillitis and supraglottitis/epiglottitis.

He has been on Zosyn and steroids.  He has improved quite a lot today.  He is able to

tolerate oral fluids now and his fever has resolved.  He still had elevation of white

blood cell count.  However, a portion of this may be attributed to steroids.  He is

feeling much better overall.  He still has some sore throat, but is having no

respiratory difficulty.



EXAM:

Vital signs shows patient is afebrile on last check.  Good oxygen saturation and

overall stable vital signs.  General:  The patient is sitting in his chair, appearing

comfortable.  No stridor.  Voice is much better with no fullness today.  There is just

slight coarseness yet.  He is swallowing, tolerating his own secretions now.  HEENT:

Head normocephalic and atraumatic.  Ears are clear bilaterally.  Nose shows minimal

congestion.  Septum deviated to the right.  The inferior turbinates are normal.  Mouth

and throat, no trismus.  Tonsils +3 but without erythema and no exudate.  This has

improved since last night.  The hypopharynx and larynx exam with flexible laryngoscopy

shows improvement with the nasopharynx unremarkable.  Hypopharynx and larynx shows

still some mild-to-moderate erythema of the epiglottis diffusely.  Airway overall has

improved with even better visualization of vocal cords and better airway, although it

is certainly not normal at this point yet.  Vocal cord mobility is normal.  Neck is

supple.  There is still some shotty lymphadenopathy bilaterally, right greater than

left, but tenderness is resolved.  There is no fluctuance and the overall

lymphadenopathy has decreased in size.



ASSESSMENT:

1. Acute tonsillitis.

2. Acute supraglottitis epiglottitis.



PLAN:

The patient has improved since last night, although has not been on medications even 24

hours as yet.  I would continue his current antibiotic and certainly the steroids.  He

will remain in the intensive care unit tonight for observation.  If he continues to

improve and progress, then could potentially be transferred to regular floor tomorrow

but will need to continue the present medications.  He will need likely at least 2 more

days of IV antibiotics and steroids at least before he will be able to be discharged

and possibly even a little bit longer depending on his progress.  If there are

questions or concerns, please free to contact me.





MMODL / IJN: [id number] / Job#: [id number]

## 2023-10-23 ENCOUNTER — HOSPITAL ENCOUNTER (OUTPATIENT)
Dept: HOSPITAL 47 - RADMRIMAIN | Age: 33
Discharge: HOME | End: 2023-10-23
Attending: FAMILY MEDICINE
Payer: COMMERCIAL

## 2023-10-23 DIAGNOSIS — M19.012: Primary | ICD-10-CM

## 2023-10-23 DIAGNOSIS — M25.812: ICD-10-CM

## 2023-10-24 NOTE — MR
EXAMINATION TYPE: MR shoulder LT wo con

 

DATE OF EXAM: 10/23/2023

 

COMPARISON: None

 

HISTORY: Left shoulder pain and limited range of motion.

 

TECHNIQUE: Multiplanar, multisequence imaging of the left shoulder is performed without contrast.

 

FINDINGS:

 

There is moderate to marked osteoarthritic change of the AC joint resulting in mild shoulder impingem
ent.

 

The glenohumeral joint is intact. There is no definite labral injury.

 

Tendons of the rotator cuff are intact there is no discrete tear or retraction. There is no subacromi
al or subdeltoid bursitis.

 

Biceps tendon is normal in location within bicipital groove and is of normal signal intensity. Biceps
 anchor is intact.

 

There is no bone contusion or fracture.

 

IMPRESSION:

 

Moderate to marked osteoarthritic change of the glenohumeral joint with mild shoulder impingement. No
 other significant abnormality seen.